# Patient Record
Sex: MALE | Race: WHITE | NOT HISPANIC OR LATINO | Employment: OTHER | URBAN - METROPOLITAN AREA
[De-identification: names, ages, dates, MRNs, and addresses within clinical notes are randomized per-mention and may not be internally consistent; named-entity substitution may affect disease eponyms.]

---

## 2023-05-04 ENCOUNTER — HOSPITAL ENCOUNTER (OUTPATIENT)
Facility: HOSPITAL | Age: 88
Discharge: NURSING, REHAB OR LONG-TERM CARE | End: 2023-05-08
Attending: EMERGENCY MEDICINE | Admitting: INTERNAL MEDICINE
Payer: OTHER GOVERNMENT

## 2023-05-04 DIAGNOSIS — R29.6 FREQUENT FALLS: ICD-10-CM

## 2023-05-04 DIAGNOSIS — R07.9 CHEST PAIN: ICD-10-CM

## 2023-05-04 DIAGNOSIS — S79.912A INJURY OF LEFT HIP, INITIAL ENCOUNTER: Primary | ICD-10-CM
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
## 2023-05-04 PROBLEM — I82.4Z3 ACUTE DEEP VEIN THROMBOSIS (DVT) OF DISTAL VEIN OF BOTH LOWER EXTREMITIES: Status: ACTIVE | Noted: 2023-04-03

## 2023-05-04 PROBLEM — N18.30 STAGE 3 CHRONIC KIDNEY DISEASE: Status: ACTIVE | Noted: 2023-05-04

## 2023-05-04 PROBLEM — L97.429 ULCER OF LEFT HEEL: Status: ACTIVE | Noted: 2023-05-04

## 2023-05-04 PROBLEM — E44.0 MALNUTRITION OF MODERATE DEGREE: Status: ACTIVE | Noted: 2023-05-04

## 2023-05-04 PROBLEM — I48.0 PAROXYSMAL ATRIAL FIBRILLATION: Status: ACTIVE | Noted: 2023-05-04

## 2023-05-04 PROBLEM — N40.0 BENIGN PROSTATIC HYPERPLASIA: Status: ACTIVE | Noted: 2023-05-04

## 2023-05-04 PROBLEM — Z95.0 S/P PLACEMENT OF CARDIAC PACEMAKER: Status: ACTIVE | Noted: 2023-05-04

## 2023-05-04 PROBLEM — D64.9 ANEMIA: Status: ACTIVE | Noted: 2023-05-04

## 2023-05-04 PROBLEM — E78.5 HYPERLIPIDEMIA: Status: ACTIVE | Noted: 2023-05-04

## 2023-05-04 PROBLEM — F03.918 DEMENTIA WITH BEHAVIORAL DISTURBANCE: Status: ACTIVE | Noted: 2023-05-04

## 2023-05-04 PROBLEM — I10 BENIGN ESSENTIAL HYPERTENSION: Status: ACTIVE | Noted: 2023-05-04

## 2023-05-04 PROBLEM — F41.1 ANXIETY STATE: Status: ACTIVE | Noted: 2023-05-04

## 2023-05-04 LAB
ALBUMIN SERPL BCP-MCNC: 2.9 G/DL (ref 3.5–5.2)
ALBUMIN SERPL BCP-MCNC: 2.9 G/DL (ref 3.5–5.2)
ALP SERPL-CCNC: 95 U/L (ref 55–135)
ALP SERPL-CCNC: 95 U/L (ref 55–135)
ALT SERPL W/O P-5'-P-CCNC: 10 U/L (ref 10–44)
ALT SERPL W/O P-5'-P-CCNC: 11 U/L (ref 10–44)
ANION GAP SERPL CALC-SCNC: 10 MMOL/L (ref 8–16)
ANION GAP SERPL CALC-SCNC: 10 MMOL/L (ref 8–16)
AST SERPL-CCNC: 20 U/L (ref 10–40)
AST SERPL-CCNC: 22 U/L (ref 10–40)
BASOPHILS # BLD AUTO: 0.06 K/UL (ref 0–0.2)
BASOPHILS # BLD AUTO: 0.07 K/UL (ref 0–0.2)
BASOPHILS NFR BLD: 0.6 % (ref 0–1.9)
BASOPHILS NFR BLD: 0.6 % (ref 0–1.9)
BILIRUB SERPL-MCNC: 0.8 MG/DL (ref 0.1–1)
BILIRUB SERPL-MCNC: 0.9 MG/DL (ref 0.1–1)
BILIRUB UR QL STRIP: NEGATIVE
BUN SERPL-MCNC: 22 MG/DL (ref 10–30)
BUN SERPL-MCNC: 24 MG/DL (ref 10–30)
CALCIUM SERPL-MCNC: 8.3 MG/DL (ref 8.7–10.5)
CALCIUM SERPL-MCNC: 8.3 MG/DL (ref 8.7–10.5)
CHLORIDE SERPL-SCNC: 105 MMOL/L (ref 95–110)
CHLORIDE SERPL-SCNC: 106 MMOL/L (ref 95–110)
CLARITY UR: CLEAR
CO2 SERPL-SCNC: 22 MMOL/L (ref 23–29)
CO2 SERPL-SCNC: 23 MMOL/L (ref 23–29)
COLOR UR: YELLOW
CREAT SERPL-MCNC: 1.5 MG/DL (ref 0.5–1.4)
CREAT SERPL-MCNC: 1.5 MG/DL (ref 0.5–1.4)
DIFFERENTIAL METHOD: ABNORMAL
DIFFERENTIAL METHOD: ABNORMAL
EOSINOPHIL # BLD AUTO: 0.4 K/UL (ref 0–0.5)
EOSINOPHIL # BLD AUTO: 0.4 K/UL (ref 0–0.5)
EOSINOPHIL NFR BLD: 3.2 % (ref 0–8)
EOSINOPHIL NFR BLD: 4.2 % (ref 0–8)
ERYTHROCYTE [DISTWIDTH] IN BLOOD BY AUTOMATED COUNT: 15.3 % (ref 11.5–14.5)
ERYTHROCYTE [DISTWIDTH] IN BLOOD BY AUTOMATED COUNT: 15.5 % (ref 11.5–14.5)
EST. GFR  (NO RACE VARIABLE): 44 ML/MIN/1.73 M^2
EST. GFR  (NO RACE VARIABLE): 44 ML/MIN/1.73 M^2
FOLATE SERPL-MCNC: 14.6 NG/ML (ref 4–24)
GLUCOSE SERPL-MCNC: 100 MG/DL (ref 70–110)
GLUCOSE SERPL-MCNC: 116 MG/DL (ref 70–110)
GLUCOSE UR QL STRIP: NEGATIVE
HCT VFR BLD AUTO: 23.8 % (ref 40–54)
HCT VFR BLD AUTO: 23.9 % (ref 40–54)
HGB BLD-MCNC: 7.7 G/DL (ref 14–18)
HGB BLD-MCNC: 7.8 G/DL (ref 14–18)
HGB UR QL STRIP: NEGATIVE
IMM GRANULOCYTES # BLD AUTO: 0.03 K/UL (ref 0–0.04)
IMM GRANULOCYTES # BLD AUTO: 0.03 K/UL (ref 0–0.04)
IMM GRANULOCYTES NFR BLD AUTO: 0.3 % (ref 0–0.5)
IMM GRANULOCYTES NFR BLD AUTO: 0.3 % (ref 0–0.5)
INR PPP: 1.2 (ref 0.8–1.2)
IRON SERPL-MCNC: 17 UG/DL (ref 45–160)
KETONES UR QL STRIP: NEGATIVE
LEUKOCYTE ESTERASE UR QL STRIP: NEGATIVE
LYMPHOCYTES # BLD AUTO: 2.4 K/UL (ref 1–4.8)
LYMPHOCYTES # BLD AUTO: 2.4 K/UL (ref 1–4.8)
LYMPHOCYTES NFR BLD: 20.7 % (ref 18–48)
LYMPHOCYTES NFR BLD: 24.3 % (ref 18–48)
MCH RBC QN AUTO: 31.6 PG (ref 27–31)
MCH RBC QN AUTO: 31.6 PG (ref 27–31)
MCHC RBC AUTO-ENTMCNC: 32.4 G/DL (ref 32–36)
MCHC RBC AUTO-ENTMCNC: 32.6 G/DL (ref 32–36)
MCV RBC AUTO: 97 FL (ref 82–98)
MCV RBC AUTO: 98 FL (ref 82–98)
MONOCYTES # BLD AUTO: 1.4 K/UL (ref 0.3–1)
MONOCYTES # BLD AUTO: 1.6 K/UL (ref 0.3–1)
MONOCYTES NFR BLD: 13.8 % (ref 4–15)
MONOCYTES NFR BLD: 14.1 % (ref 4–15)
NEUTROPHILS # BLD AUTO: 5.6 K/UL (ref 1.8–7.7)
NEUTROPHILS # BLD AUTO: 7.1 K/UL (ref 1.8–7.7)
NEUTROPHILS NFR BLD: 56.8 % (ref 38–73)
NEUTROPHILS NFR BLD: 61.1 % (ref 38–73)
NITRITE UR QL STRIP: NEGATIVE
NRBC BLD-RTO: 0 /100 WBC
NRBC BLD-RTO: 0 /100 WBC
PH UR STRIP: 6 [PH] (ref 5–8)
PLATELET # BLD AUTO: 239 K/UL (ref 150–450)
PLATELET # BLD AUTO: 241 K/UL (ref 150–450)
PMV BLD AUTO: 8.8 FL (ref 9.2–12.9)
PMV BLD AUTO: 9.2 FL (ref 9.2–12.9)
POTASSIUM SERPL-SCNC: 3.4 MMOL/L (ref 3.5–5.1)
POTASSIUM SERPL-SCNC: 3.5 MMOL/L (ref 3.5–5.1)
PROT SERPL-MCNC: 6.3 G/DL (ref 6–8.4)
PROT SERPL-MCNC: 6.5 G/DL (ref 6–8.4)
PROT UR QL STRIP: NEGATIVE
PROTHROMBIN TIME: 13 SEC (ref 9–12.5)
RBC # BLD AUTO: 2.44 M/UL (ref 4.6–6.2)
RBC # BLD AUTO: 2.47 M/UL (ref 4.6–6.2)
SATURATED IRON: 8 % (ref 20–50)
SODIUM SERPL-SCNC: 138 MMOL/L (ref 136–145)
SODIUM SERPL-SCNC: 138 MMOL/L (ref 136–145)
SP GR UR STRIP: 1.01 (ref 1–1.03)
TOTAL IRON BINDING CAPACITY: 212 UG/DL (ref 250–450)
TRANSFERRIN SERPL-MCNC: 143 MG/DL (ref 200–375)
URN SPEC COLLECT METH UR: NORMAL
UROBILINOGEN UR STRIP-ACNC: NEGATIVE EU/DL
VIT B12 SERPL-MCNC: 325 PG/ML (ref 210–950)
WBC # BLD AUTO: 11.65 K/UL (ref 3.9–12.7)
WBC # BLD AUTO: 9.82 K/UL (ref 3.9–12.7)

## 2023-05-04 PROCEDURE — 82746 ASSAY OF FOLIC ACID SERUM: CPT | Performed by: INTERNAL MEDICINE

## 2023-05-04 PROCEDURE — 30200315 PPD INTRADERMAL TEST REV CODE 302: Performed by: INTERNAL MEDICINE

## 2023-05-04 PROCEDURE — 25000003 PHARM REV CODE 250: Performed by: INTERNAL MEDICINE

## 2023-05-04 PROCEDURE — 36415 COLL VENOUS BLD VENIPUNCTURE: CPT | Performed by: EMERGENCY MEDICINE

## 2023-05-04 PROCEDURE — 96360 HYDRATION IV INFUSION INIT: CPT

## 2023-05-04 PROCEDURE — 97116 GAIT TRAINING THERAPY: CPT

## 2023-05-04 PROCEDURE — 85610 PROTHROMBIN TIME: CPT | Performed by: EMERGENCY MEDICINE

## 2023-05-04 PROCEDURE — 80053 COMPREHEN METABOLIC PANEL: CPT | Mod: 91 | Performed by: NURSE PRACTITIONER

## 2023-05-04 PROCEDURE — G0378 HOSPITAL OBSERVATION PER HR: HCPCS

## 2023-05-04 PROCEDURE — 86580 TB INTRADERMAL TEST: CPT | Performed by: INTERNAL MEDICINE

## 2023-05-04 PROCEDURE — 82607 VITAMIN B-12: CPT | Performed by: INTERNAL MEDICINE

## 2023-05-04 PROCEDURE — 36415 COLL VENOUS BLD VENIPUNCTURE: CPT | Performed by: NURSE PRACTITIONER

## 2023-05-04 PROCEDURE — 97162 PT EVAL MOD COMPLEX 30 MIN: CPT

## 2023-05-04 PROCEDURE — 85025 COMPLETE CBC W/AUTO DIFF WBC: CPT | Performed by: EMERGENCY MEDICINE

## 2023-05-04 PROCEDURE — 25000003 PHARM REV CODE 250: Performed by: NURSE PRACTITIONER

## 2023-05-04 PROCEDURE — 63600175 PHARM REV CODE 636 W HCPCS: Performed by: NURSE PRACTITIONER

## 2023-05-04 PROCEDURE — 80053 COMPREHEN METABOLIC PANEL: CPT | Performed by: EMERGENCY MEDICINE

## 2023-05-04 PROCEDURE — 84466 ASSAY OF TRANSFERRIN: CPT | Performed by: INTERNAL MEDICINE

## 2023-05-04 PROCEDURE — 99285 EMERGENCY DEPT VISIT HI MDM: CPT | Mod: 25

## 2023-05-04 PROCEDURE — 96361 HYDRATE IV INFUSION ADD-ON: CPT

## 2023-05-04 PROCEDURE — 85025 COMPLETE CBC W/AUTO DIFF WBC: CPT | Mod: 91 | Performed by: NURSE PRACTITIONER

## 2023-05-04 PROCEDURE — 81003 URINALYSIS AUTO W/O SCOPE: CPT | Performed by: INTERNAL MEDICINE

## 2023-05-04 PROCEDURE — 36415 COLL VENOUS BLD VENIPUNCTURE: CPT | Performed by: INTERNAL MEDICINE

## 2023-05-04 RX ORDER — SODIUM,POTASSIUM PHOSPHATES 280-250MG
2 POWDER IN PACKET (EA) ORAL
Status: DISCONTINUED | OUTPATIENT
Start: 2023-05-04 | End: 2023-05-08 | Stop reason: HOSPADM

## 2023-05-04 RX ORDER — TALC
9 POWDER (GRAM) TOPICAL NIGHTLY PRN
Status: DISCONTINUED | OUTPATIENT
Start: 2023-05-04 | End: 2023-05-08 | Stop reason: HOSPADM

## 2023-05-04 RX ORDER — ONDANSETRON 2 MG/ML
8 INJECTION INTRAMUSCULAR; INTRAVENOUS EVERY 6 HOURS PRN
Status: DISCONTINUED | OUTPATIENT
Start: 2023-05-04 | End: 2023-05-08 | Stop reason: HOSPADM

## 2023-05-04 RX ORDER — LANOLIN ALCOHOL/MO/W.PET/CERES
1000 CREAM (GRAM) TOPICAL DAILY
Status: DISCONTINUED | OUTPATIENT
Start: 2023-05-04 | End: 2023-05-08 | Stop reason: HOSPADM

## 2023-05-04 RX ORDER — SODIUM CHLORIDE, SODIUM LACTATE, POTASSIUM CHLORIDE, CALCIUM CHLORIDE 600; 310; 30; 20 MG/100ML; MG/100ML; MG/100ML; MG/100ML
INJECTION, SOLUTION INTRAVENOUS CONTINUOUS
Status: DISCONTINUED | OUTPATIENT
Start: 2023-05-04 | End: 2023-05-08 | Stop reason: HOSPADM

## 2023-05-04 RX ORDER — NYSTATIN 100000 U/G
CREAM TOPICAL 2 TIMES DAILY
COMMUNITY

## 2023-05-04 RX ORDER — QUETIAPINE FUMARATE 25 MG/1
50 TABLET, FILM COATED ORAL NIGHTLY
Status: DISCONTINUED | OUTPATIENT
Start: 2023-05-04 | End: 2023-05-08 | Stop reason: HOSPADM

## 2023-05-04 RX ORDER — ASPIRIN 81 MG/1
81 TABLET ORAL DAILY
COMMUNITY

## 2023-05-04 RX ORDER — TAMSULOSIN HYDROCHLORIDE 0.4 MG/1
0.4 CAPSULE ORAL DAILY
Status: DISCONTINUED | OUTPATIENT
Start: 2023-05-04 | End: 2023-05-08 | Stop reason: HOSPADM

## 2023-05-04 RX ORDER — TAMSULOSIN HYDROCHLORIDE 0.4 MG/1
CAPSULE ORAL DAILY
COMMUNITY

## 2023-05-04 RX ORDER — ATORVASTATIN CALCIUM 40 MG/1
40 TABLET, FILM COATED ORAL DAILY
Status: DISCONTINUED | OUTPATIENT
Start: 2023-05-04 | End: 2023-05-08 | Stop reason: HOSPADM

## 2023-05-04 RX ORDER — QUETIAPINE FUMARATE 50 MG/1
50 TABLET, FILM COATED ORAL NIGHTLY
COMMUNITY

## 2023-05-04 RX ORDER — LANOLIN ALCOHOL/MO/W.PET/CERES
800 CREAM (GRAM) TOPICAL
Status: DISCONTINUED | OUTPATIENT
Start: 2023-05-04 | End: 2023-05-08 | Stop reason: HOSPADM

## 2023-05-04 RX ORDER — GLUCAGON 1 MG
1 KIT INJECTION
Status: DISCONTINUED | OUTPATIENT
Start: 2023-05-04 | End: 2023-05-08 | Stop reason: HOSPADM

## 2023-05-04 RX ORDER — MIRTAZAPINE 15 MG/1
15 TABLET, FILM COATED ORAL NIGHTLY
COMMUNITY

## 2023-05-04 RX ORDER — ACETAMINOPHEN 325 MG/1
650 TABLET ORAL EVERY 4 HOURS PRN
Status: DISCONTINUED | OUTPATIENT
Start: 2023-05-04 | End: 2023-05-08 | Stop reason: HOSPADM

## 2023-05-04 RX ORDER — ACETAMINOPHEN 325 MG/1
650 TABLET ORAL EVERY 8 HOURS PRN
Status: DISCONTINUED | OUTPATIENT
Start: 2023-05-04 | End: 2023-05-08 | Stop reason: HOSPADM

## 2023-05-04 RX ORDER — DEXTROSE 40 %
30 GEL (GRAM) ORAL
Status: DISCONTINUED | OUTPATIENT
Start: 2023-05-04 | End: 2023-05-08 | Stop reason: HOSPADM

## 2023-05-04 RX ORDER — POLYETHYLENE GLYCOL 3350 17 G/17G
17 POWDER, FOR SOLUTION ORAL DAILY
Status: DISCONTINUED | OUTPATIENT
Start: 2023-05-04 | End: 2023-05-08 | Stop reason: HOSPADM

## 2023-05-04 RX ORDER — MEMANTINE HYDROCHLORIDE 10 MG/1
5 TABLET ORAL 2 TIMES DAILY
COMMUNITY

## 2023-05-04 RX ORDER — MAG HYDROX/ALUMINUM HYD/SIMETH 200-200-20
30 SUSPENSION, ORAL (FINAL DOSE FORM) ORAL 4 TIMES DAILY PRN
Status: DISCONTINUED | OUTPATIENT
Start: 2023-05-04 | End: 2023-05-08 | Stop reason: HOSPADM

## 2023-05-04 RX ORDER — ASPIRIN 81 MG/1
81 TABLET ORAL DAILY
Status: DISCONTINUED | OUTPATIENT
Start: 2023-05-04 | End: 2023-05-08 | Stop reason: HOSPADM

## 2023-05-04 RX ORDER — NALOXONE HCL 0.4 MG/ML
0.02 VIAL (ML) INJECTION
Status: DISCONTINUED | OUTPATIENT
Start: 2023-05-04 | End: 2023-05-08 | Stop reason: HOSPADM

## 2023-05-04 RX ORDER — CARVEDILOL 3.12 MG/1
3.12 TABLET ORAL 2 TIMES DAILY WITH MEALS
Status: DISCONTINUED | OUTPATIENT
Start: 2023-05-04 | End: 2023-05-08 | Stop reason: HOSPADM

## 2023-05-04 RX ORDER — MIRTAZAPINE 15 MG/1
15 TABLET, FILM COATED ORAL NIGHTLY
Status: DISCONTINUED | OUTPATIENT
Start: 2023-05-04 | End: 2023-05-08 | Stop reason: HOSPADM

## 2023-05-04 RX ORDER — MEMANTINE HYDROCHLORIDE 5 MG/1
5 TABLET ORAL 2 TIMES DAILY
Status: DISCONTINUED | OUTPATIENT
Start: 2023-05-04 | End: 2023-05-08 | Stop reason: HOSPADM

## 2023-05-04 RX ORDER — DEXTROSE 40 %
15 GEL (GRAM) ORAL
Status: DISCONTINUED | OUTPATIENT
Start: 2023-05-04 | End: 2023-05-08 | Stop reason: HOSPADM

## 2023-05-04 RX ORDER — CARVEDILOL 6.25 MG/1
6.25 TABLET ORAL 2 TIMES DAILY WITH MEALS
Status: DISCONTINUED | OUTPATIENT
Start: 2023-05-04 | End: 2023-05-04

## 2023-05-04 RX ORDER — SIMETHICONE 80 MG
1 TABLET,CHEWABLE ORAL 4 TIMES DAILY PRN
Status: DISCONTINUED | OUTPATIENT
Start: 2023-05-04 | End: 2023-05-08 | Stop reason: HOSPADM

## 2023-05-04 RX ORDER — CARVEDILOL 6.25 MG/1
6.25 TABLET ORAL 2 TIMES DAILY WITH MEALS
Status: ON HOLD | COMMUNITY
End: 2023-05-08 | Stop reason: HOSPADM

## 2023-05-04 RX ORDER — FUROSEMIDE 40 MG/1
40 TABLET ORAL DAILY
Status: ON HOLD | COMMUNITY
End: 2023-05-08 | Stop reason: HOSPADM

## 2023-05-04 RX ORDER — SODIUM CHLORIDE 0.9 % (FLUSH) 0.9 %
3 SYRINGE (ML) INJECTION EVERY 12 HOURS PRN
Status: DISCONTINUED | OUTPATIENT
Start: 2023-05-04 | End: 2023-05-08 | Stop reason: HOSPADM

## 2023-05-04 RX ORDER — FUROSEMIDE 40 MG/1
40 TABLET ORAL 2 TIMES DAILY
Status: DISCONTINUED | OUTPATIENT
Start: 2023-05-04 | End: 2023-05-04

## 2023-05-04 RX ADMIN — APIXABAN 5 MG: 2.5 TABLET, FILM COATED ORAL at 08:05

## 2023-05-04 RX ADMIN — SODIUM CHLORIDE, POTASSIUM CHLORIDE, SODIUM LACTATE AND CALCIUM CHLORIDE: 600; 310; 30; 20 INJECTION, SOLUTION INTRAVENOUS at 06:05

## 2023-05-04 RX ADMIN — MEMANTINE 5 MG: 5 TABLET ORAL at 08:05

## 2023-05-04 RX ADMIN — TAMSULOSIN HYDROCHLORIDE 0.4 MG: 0.4 CAPSULE ORAL at 08:05

## 2023-05-04 RX ADMIN — MIRTAZAPINE 15 MG: 15 TABLET, FILM COATED ORAL at 09:05

## 2023-05-04 RX ADMIN — CARVEDILOL 6.25 MG: 6.25 TABLET, FILM COATED ORAL at 08:05

## 2023-05-04 RX ADMIN — APIXABAN 5 MG: 2.5 TABLET, FILM COATED ORAL at 09:05

## 2023-05-04 RX ADMIN — ATORVASTATIN CALCIUM 40 MG: 40 TABLET, FILM COATED ORAL at 08:05

## 2023-05-04 RX ADMIN — FUROSEMIDE 40 MG: 40 TABLET ORAL at 08:05

## 2023-05-04 RX ADMIN — CARVEDILOL 3.12 MG: 3.12 TABLET, FILM COATED ORAL at 05:05

## 2023-05-04 RX ADMIN — TUBERCULIN PURIFIED PROTEIN DERIVATIVE 5 UNITS: 5 INJECTION, SOLUTION INTRADERMAL at 04:05

## 2023-05-04 RX ADMIN — QUETIAPINE 50 MG: 25 TABLET ORAL at 09:05

## 2023-05-04 RX ADMIN — ASPIRIN 81 MG: 81 TABLET, COATED ORAL at 08:05

## 2023-05-04 RX ADMIN — MEMANTINE 5 MG: 5 TABLET ORAL at 09:05

## 2023-05-04 NOTE — PLAN OF CARE
05/04/23 1400   CHRISTINE Message   Medicare Outpatient and Observation Notification regarding financial responsibility Given to patient/caregiver;Signed/date by patient/caregiver;Explained to patient/caregiver   Date CHRISTINE was signed 05/04/23   Time CHRISTINE was signed 1400     Explained to pt's daughter, Jesika, over the phone. All questions answered and Lizbeth gave telephone consent for form

## 2023-05-04 NOTE — HPI
Babak Nunez is a 89 year old male with a past medical history of Afib, HTN, HLD, dementia, BPH, DVT and pacemaker placement, who presented to the ED with a complaint of left hip pain after sustaining a fall at home. HPI per family and patient. His family reports that he has been having difficulty with his left hip, which has been coming out of socket more frequently. He was due to have surgery on it today but due to recent events was cancelled. According to his daughters, after a recent admission at [facility], he was sent to [facility] in [place] after hip surgery. They were told that in order to discharge him back home, they would be setting up a hospital bed, walker and wheelchair, as well as other home services to provide for a safe discharge. He began to develop some behavioral issues and was transferred to [facility], where he was diagnosed with early dementia. [facility] discharged the patient back to [facility], but [facility] would not accept for unclear reasons, causing the patient to return home with no DME or home health services. He states he does not want to live in a nursing home, and wants to be at his own house. His wife is also elderly with multiple medical issues, and is unable to care for him on her own. The patient currently denies complaint except for some soreness to the left hip. He denies other complaint    ED work up included a CBC, which showed chronic anemia. CMP showed CKD and chronic moderate malnutrition. Xray of the left hip showed no acute fracture or dislocation. Due increase in falls with multiple risk factors for complication, decision made to admit to observation for PT/OT evaluation and case management consultation. Hospital Medicine consulted for admission and further management.

## 2023-05-04 NOTE — PLAN OF CARE
Problem: Physical Therapy  Goal: Physical Therapy Goal  Description: Goals to be met by: 2023     Patient will increase functional independence with mobility by performin. Supine to sit with MInimal Assistance  2. Sit to stand transfer with Minimal Assistance  3. Bed to chair transfer with Minimal Assistance using Rolling Walker  4. Gait  x 100 feet with Minimal Assistance using Rolling Walker.   5. Lower extremity exercise program x20 reps   Outcome: Ongoing, Progressing   PT eval and treat. Gait 30ft with RW min /mod assist with another person following with chair. SNF

## 2023-05-04 NOTE — PT/OT/SLP EVAL
Physical Therapy Evaluation    Patient Name:  Babak Nunez   MRN:  [id number]    Recommendations:     Discharge Recommendations: nursing facility, skilled   Discharge Equipment Recommendations: none   Barriers to discharge: Decreased caregiver support    Assessment:     Babak Nunez is a 89 y.o. male admitted with a medical diagnosis of Frequent falls.  He presents with the following impairments/functional limitations: weakness, impaired endurance, impaired functional mobility, gait instability, impaired balance, decreased lower extremity function, decreased safety awareness, impaired cardiopulmonary response to activity .    Pt seen supine in bed and agreeable to PT. Pt c/o having the urge to urinate but couldn't. Pt requiring min/mod assist to sit EOB with extra time. Pt stood with RW min/mod assist and ambulated with RW 30ft with another person following with chair with seated rest. Pt stated of 2 falls at home.  Pt to benefit from continued therapies at SNF.    Rehab Prognosis: Fair; patient would benefit from acute skilled PT services to address these deficits and reach maximum level of function.    Recent Surgery: * No surgery found *      Plan:     During this hospitalization, patient to be seen 6 x/week to address the identified rehab impairments via gait training, therapeutic activities, therapeutic exercises and progress toward the following goals:    Plan of Care Expires:  05/30/23    Subjective   Pt stated that he lives at home with spouse who has other medical issues  Stated of having fallen 2x at home  Stated he has 2 home  [place] and in [place]  Chief Complaint: needing to urinate but couldn't  Patient/Family Comments/goals: get stronger  Pain/Comfort:  Pain Rating 1: 0/10    Patients cultural, spiritual, [employer]al conflicts given the current situation:      Living Environment:  Home with spouse  Prior to admission, patients level of function was ambulatory household distance with falls.  Equipment  used at home: walker, rolling.  DME owned (not currently used): none.  Upon discharge, patient will have assistance from family.    Objective:     Communicated with nurse Mckinney prior to session.  Patient found HOB elevated with telemetry, bed alarm  upon PT entry to room.    General Precautions: Standard, fall  Orthopedic Precautions:N/A   Braces: N/A  Respiratory Status: Room air    Exams:  Postural Exam:  Patient presented with the following abnormalities:    -       Rounded shoulders  -       Forward head  -       BMI 24  RLE ROM: WFL  RLE Strength: Deficits: 3/5  LLE ROM: WFL  LLE Strength: Deficits: 3/5    Functional Mobility:  Bed Mobility:     Scooting: moderate assistance  Supine to Sit: moderate assistance  Transfers:     Sit to Stand:  minimum assistance and moderate assistance with rolling walker  Bed to Chair: moderate assistance with  rolling walker  using  Stand Pivot  Gait: 30ft with RW min/mod assist and another person following with chair      AM-PAC 6 CLICK MOBILITY  Total Score:16       Treatment & Education:  Patient was educated on the importance of OOB activity and functional mobility to negate negative effects of prolonged bed rest during hospitalization, safe transfers and ambulation, and D/C planning   OOB chair post PT  Pt attempting to void but unable    Patient left up in chair with all lines intact, call button in reach, chair alarm on, and nurse Mckinney present.    GOALS:   Multidisciplinary Problems       Physical Therapy Goals          Problem: Physical Therapy    Goal Priority Disciplines Outcome Goal Variances Interventions   Physical Therapy Goal     PT, PT/OT Ongoing, Progressing     Description: Goals to be met by: 2023     Patient will increase functional independence with mobility by performin. Supine to sit with MInimal Assistance  2. Sit to stand transfer with Minimal Assistance  3. Bed to chair transfer with Minimal Assistance using Rolling Walker  4. Gait  x  100 feet with Minimal Assistance using Rolling Walker.   5. Lower extremity exercise program x20 reps                        History:     Past Medical History:   Diagnosis Date    Afib     Anticoagulant long-term use     BPH (benign prostatic hyperplasia)     Hyperlipemia     Hypertension     Renal disorder        Past Surgical History:   Procedure Laterality Date    CARDIAC SURGERY      HIP REPLACEMENT ARTHROPLASTY Left     INSERTION OF PACEMAKER         Time Tracking:     PT Received On: 05/04/23  PT Start Time: 1014     PT Stop Time: 1042  PT Total Time (min): 28 min     Billable Minutes: Evaluation 10 and Gait Training 18      05/04/2023

## 2023-05-04 NOTE — ASSESSMENT & PLAN NOTE
Chronic, controlled.  Latest blood pressure and vitals reviewed-   Temp:  [98.4 °F (36.9 °C)]   Pulse:  [60-65]   Resp:  [18]   BP: (122-131)/(60-70)   SpO2:  [98 %-99 %] .   Home meds for hypertension were reviewed and noted below.   Hypertension Medications             carvediloL (COREG) 6.25 MG tablet Take 6.25 mg by mouth 2 (two) times daily with meals.    furosemide (LASIX) 40 MG tablet Take 40 mg by mouth 2 (two) times daily.          While in the hospital, will manage blood pressure as follows; Continue home antihypertensive regimen    Will utilize p.r.n. blood pressure medication only if patient's blood pressure greater than  180/110 and he develops symptoms such as worsening chest pain or shortness of breath.

## 2023-05-04 NOTE — ASSESSMENT & PLAN NOTE
Nutrition consulted. Most recent weight and BMI monitored-     Measurements:  Wt Readings from Last 1 Encounters:   05/04/23 94.3 kg (208 lb)   Body mass index is 29.01 kg/m².    Patient has been screened and assessed by RD.    Malnutrition Type:  Context:    Level:      Malnutrition Characteristic Summary:       Interventions/Recommendations (treatment strategy):

## 2023-05-04 NOTE — UM SECONDARY REVIEW
Other (see comment)    Level of Care Issue    Call received from Astrid with PHN stating pt does not meet observation. Offering peer to peer. Message to Dr Estrada with no response. Case being sent to MRU for review. kv

## 2023-05-04 NOTE — SUBJECTIVE & OBJECTIVE
Past Medical History:   Diagnosis Date    Afib     Anticoagulant long-term use     BPH (benign prostatic hyperplasia)     Hyperlipemia     Hypertension     Renal disorder        Past Surgical History:   Procedure Laterality Date    CARDIAC SURGERY      HIP REPLACEMENT ARTHROPLASTY Left     INSERTION OF PACEMAKER         Review of patient's allergies indicates:  No Known Allergies    No current facility-administered medications on file prior to encounter.     Current Outpatient Medications on File Prior to Encounter   Medication Sig    apixaban (ELIQUIS) 5 mg Tab Take 5 mg by mouth 2 (two) times daily.    aspirin (ASPIR-81) 81 MG EC tablet Take 81 mg by mouth once daily.    ATORVASTATIN CALCIUM (ATORVASTATIN ORAL) Take by mouth.    carvediloL (COREG) 6.25 MG tablet Take 6.25 mg by mouth 2 (two) times daily with meals.    furosemide (LASIX) 40 MG tablet Take 40 mg by mouth 2 (two) times daily.    memantine (NAMENDA) 10 MG Tab Take 5 mg by mouth 2 (two) times daily.    mirtazapine (REMERON) 15 MG tablet Take 15 mg by mouth every evening.    nystatin (MYCOSTATIN) cream Apply topically 2 (two) times daily.    QUEtiapine (SEROQUEL) 50 MG tablet Take 50 mg by mouth every evening.    tamsulosin (FLOMAX) 0.4 mg Cap Take by mouth once daily.    [DISCONTINUED] FLUZONE HIGH-DOSE 2015-16, PF, 180 mcg/0.5 mL Syrg     [DISCONTINUED] WARFARIN SODIUM (COUMADIN ORAL) Take by mouth.     Family History    None       Tobacco Use    Smoking status: Never    Smokeless tobacco: Not on file   Substance and Sexual Activity    Alcohol use: Yes     Comment: 2beers/day    Drug use: Not on file    Sexual activity: Not on file     Review of Systems   Constitutional:  Negative for chills and fever.   HENT:  Negative for congestion and sore throat.    Eyes:  Negative for visual disturbance.   Respiratory:  Negative for cough and shortness of breath.    Cardiovascular:  Negative for chest pain and palpitations.   Gastrointestinal:  Negative for  abdominal pain, constipation, diarrhea, nausea and vomiting.   Endocrine: Negative for cold intolerance and heat intolerance.   Genitourinary:  Negative for dysuria and hematuria.   Musculoskeletal:  Positive for arthralgias and gait problem (frequent falls). Negative for myalgias.   Skin:  Negative for rash.   Neurological:  Negative for dizziness, tremors and seizures.   Hematological:  Negative for adenopathy. Does not bruise/bleed easily.   All other systems reviewed and are negative.  Objective:     Vital Signs (Most Recent):  Temp: 98.4 °F (36.9 °C) (05/04/23 0040)  Pulse: 60 (05/04/23 0434)  Resp: 18 (05/04/23 0040)  BP: (!) 109/53 (05/04/23 0434)  SpO2: 96 % (05/04/23 0434)   Vital Signs (24h Range):  Temp:  [98.4 °F (36.9 °C)] 98.4 °F (36.9 °C)  Pulse:  [60-65] 60  Resp:  [18] 18  SpO2:  [96 %-99 %] 96 %  BP: (109-131)/(53-70) 109/53     Weight: 94.3 kg (208 lb)  Body mass index is 29.01 kg/m².    Physical Exam  Vitals and nursing note reviewed.   Constitutional:       General: He is awake. He is not in acute distress.     Appearance: Normal appearance. He is well-developed and overweight. He is ill-appearing (elderly).   HENT:      Head: Normocephalic and atraumatic.      Nose: Nose normal. No septal deviation.      Mouth/Throat:      Lips: Pink.      Mouth: Mucous membranes are moist.   Eyes:      Conjunctiva/sclera: Conjunctivae normal.      Pupils: Pupils are equal, round, and reactive to light.   Neck:      Thyroid: No thyroid mass.      Vascular: No JVD.      Trachea: No tracheal tenderness or tracheal deviation.   Cardiovascular:      Rate and Rhythm: Normal rate and regular rhythm.      Heart sounds: S1 normal and S2 normal. No murmur heard.    No friction rub. No gallop.   Pulmonary:      Effort: Pulmonary effort is normal.      Breath sounds: Normal breath sounds. No decreased breath sounds, wheezing, rhonchi or rales.   Abdominal:      General: Bowel sounds are normal. There is no distension.       Palpations: Abdomen is soft. There is no hepatomegaly, splenomegaly or mass.      Tenderness: There is no abdominal tenderness.   Musculoskeletal:      Cervical back: Full passive range of motion without pain, normal range of motion and neck supple.      Left hip: Decreased range of motion.      Left lower leg: Edema present.   Skin:     General: Skin is warm.      Findings: Wound present. No rash.          Neurological:      General: No focal deficit present.      Mental Status: He is alert and oriented to person, place, and time.      Cranial Nerves: No cranial nerve deficit.      Sensory: No sensory deficit.   Psychiatric:         Mood and Affect: Mood normal.         Behavior: Behavior normal. Behavior is cooperative.         CRANIAL NERVES     CN III, IV, VI   Pupils are equal, round, and reactive to light.     Significant Labs: All pertinent labs within the past 24 hours have been reviewed.  CBC:   Recent Labs   Lab 05/04/23  0124   WBC 11.65   HGB 7.8*   HCT 23.9*        CMP:   Recent Labs   Lab 05/04/23  0124      K 3.5      CO2 22*   *   BUN 24   CREATININE 1.5*   CALCIUM 8.3*   PROT 6.5   ALBUMIN 2.9*   BILITOT 0.8   ALKPHOS 95   AST 20   ALT 11   ANIONGAP 10     Coagulation:   Recent Labs   Lab 05/04/23  0124   INR 1.2       Significant Imaging: I have reviewed all pertinent imaging results/findings within the past 24 hours.  Xray left hip: no acute findings

## 2023-05-04 NOTE — PLAN OF CARE
Problem: Adult Inpatient Plan of Care  Goal: Plan of Care Review  Outcome: Ongoing, Progressing  Goal: Patient-Specific Goal (Individualized)  Outcome: Ongoing, Progressing  Goal: Absence of Hospital-Acquired Illness or Injury  Outcome: Ongoing, Progressing  Goal: Optimal Comfort and Wellbeing  Outcome: Ongoing, Progressing  Goal: Readiness for Transition of Care  Outcome: Ongoing, Progressing     Problem: Fall Injury Risk  Goal: Absence of Fall and Fall-Related Injury  Outcome: Ongoing, Progressing   Plan of care reviewed with patient & daughters verbalized understanding.  IV fluids administered as per orders. Remains free from falls, injury. Instructed to call for assistance as needed ,verbalized understanding. Bed in low & locked position. Call light in reach, bed alarm on .

## 2023-05-04 NOTE — PROGRESS NOTES
[facility]  Adult Nutrition  Progress Note    SUMMARY       Recommendations  1) Continue Cardiac diet  2) Send Boost plus 1 x daily + Mirza BID   3) weigh weekly    Goals: 1) PO Intakes > 50% of meals and supplements at f/u  Nutrition Goal Status: new  Communication of RD Recs:  (POC, sticky note)     Assessment and Plan    Malnutrition of moderate degree  Malnutrition Type:  Context: chronic illness  Level: moderate    Related to (etiology):   Inadequate energy/protein intake    Signs and Symptoms (as evidenced by):   Wound  Malnutrition Characteristic Summary:  Subcutaneous Fat (Malnutrition): mild depletion  Muscle Mass (Malnutrition): mild depletion  Fluid Accumulation (Malnutrition): moderate (3+)      Interventions/Recommendations (treatment strategy):  Fat/sodium modified diet and nutrition supplement therapy  1) Continue Cardiac diet 2) Send Boost plus 1 x daily 3) weigh weekly    Nutrition Diagnosis Status:   new         Malnutrition Assessment  Malnutrition Context: chronic illness  Malnutrition Level: moderate  Skin (Micronutrient):  (Jack = 16, heel ulcer)   Micronutrient Evaluation Summary: suspected deficiency  Micronutrient Evaluation Comments: potassium and check iron, vitamin C, zinc   Subcutaneous Fat (Malnutrition): mild depletion  Muscle Mass (Malnutrition): mild depletion  Fluid Accumulation (Malnutrition): moderate (3+)   Orbital Region (Subcutaneous Fat Loss): mild depletion  Upper Arm Region (Subcutaneous Fat Loss): mild depletion  Thoracic and Lumbar Region: mild depletion   [place] Region (Muscle Loss): mild depletion  Clavicle Bone Region (Muscle Loss): mild depletion  Clavicle and Acromion Bone Region (Muscle Loss): mild depletion  Dorsal Hand (Muscle Loss): mild depletion  Patellar Region (Muscle Loss): mild depletion  Anterior Thigh Region (Muscle Loss): mild depletion  Posterior Calf Region (Muscle Loss): mild depletion            Reason for Assessment    Reason  "For Assessment: identified at risk by screening criteria  Diagnosis:  (frequent falls)  Relevant Medical History: BPH, DVT, AFIB, pacemaker, HTN, HLD, dementia  Interdisciplinary Rounds: attended    General Information Comments: 89 y/o male admitted with hip pain s/p frequent falls. @ visit, pt tells me he ate 100% of breakfast and usually eats 3 meals daily at home, unsure the accuracy of his recall. Denies GI symptoms. NFPE 5/4/23 mild wasting seen + wound. No significant wt loss per chart reviews.    Nutrition Discharge Planning: To be determined- Cardiac diet + Boost plus 1-2 x daily if needed + Mirza BID    Nutrition Risk Screen    Nutrition Risk Screen: no indicators present    Nutrition/Diet History    Patient Reported Diet/Restrictions/Preferences: general  Spiritual, Cultural Beliefs, Latter day Practices, Values that Affect Care: no  Food Allergies: NKFA  Factors Affecting Nutritional Intake: None identified at this time    Anthropometrics    Temp: 97.8 °F (36.6 °C)  Height Method: Stated  Height: 5' 10"  Height (inches): 70 in  Weight Method: Bed Scale  Weight: 76.2 kg (167 lb 15.9 oz)  Weight (lb): 167.99 lb  Ideal Body Weight (IBW), Male: 166 lb  % Ideal Body Weight, Male (lb): 101.2 %  BMI (Calculated): 24.1  BMI Grade: 18.5-24.9 - normal      Wt Readings from Last 30 Encounters:   05/04/23 76.2 kg (167 lb 15.9 oz)   12/28/15 89.4 kg (197 lb)   12/13/15 89.4 kg (197 lb 1.6 oz)       Lab/Procedures/Meds    Pertinent Labs Reviewed: reviewed  BMP  Lab Results   Component Value Date     05/04/2023    K 3.4 (L) 05/04/2023     05/04/2023    CO2 23 05/04/2023    BUN 22 05/04/2023    CREATININE 1.5 (H) 05/04/2023    CALCIUM 8.3 (L) 05/04/2023    ANIONGAP 10 05/04/2023    EGFRNORACEVR 44 (A) 05/04/2023     Lab Results   Component Value Date    ALBUMIN 2.9 (L) 05/04/2023   '  Pertinent Medications Reviewed: reviewed  Pertinent Medications Comments: Kbicarb, mirtazapine, zofran, KNaPhos, lasix, statin, " polyethylene glycol, lactated ringers @ 75 ml/hr    Estimated/Assessed Needs    Weight Used For Calorie Calculations: 76.2 kg (167 lb 15.9 oz)  Energy Calorie Requirements (kcal): MSJ ( x 1.2-1.4) = 5917-3614 kcal  Energy Need Method: [place]-St Jeor  Protein Requirements: 1.2-1.5 g protein;/kg ( wasting/wound) =  g  Weight Used For Protein Calculations: 76.2 kg (167 lb 15.9 oz)  Fluid Requirements (mL): 3440-8853 ml or oper MD  Estimated Fluid Requirement Method: RDA Method  CHO Requirement: N/A      Nutrition Prescription Ordered    Current Diet Order: Cardiac Diet    Evaluation of Received Nutrient/Fluid Intake    Energy Calories Required: meeting needs  Protein Required: meeting needs  Fluid Required: not meeting needs  Tolerance: tolerating     Intake/Output Summary (Last 24 hours) at 5/4/2023 1151  Last data filed at 5/4/2023 0800  Gross per 24 hour   Intake 340 ml   Output --   Net 340 ml       % Intake of Estimated Energy Needs: %  % Meal Intake: % per pt    Nutrition Risk    Level of Risk/Frequency of Follow-up:  (2 x weekly)     Monitor and Evaluation    Food and Nutrient Intake: energy intake, food and beverage intake  Food and Nutrient Adminstration: diet order  Physical Activity and Function: nutrition-related ADLs and IADLs  Anthropometric Measurements: weight  Biochemical Data, Medical Tests and Procedures: electrolyte and renal panel, gastrointestinal profile  Nutrition-Focused Physical Findings: skin     Nutrition Follow-Up    RD Follow-up?: Yes

## 2023-05-04 NOTE — H&P
[facility] Medicine  History & Physical    Patient Name: Babak Nunez  MRN: [id number]  Patient Class: OP- Observation  Admission Date: 5/4/2023  Attending Physician: Haritha Estrada MD   Primary Care Provider: [facility]         Patient information was obtained from patient, relative(s), past medical records and ER records.     Subjective:     Principal Problem:Frequent falls    Chief Complaint:   Chief Complaint   Patient presents with    Fall     Per ems, fell out of bed going to bathroom.  Left hip pain, no LOC.  + shortening of left leg.  Pt. On blood thinners.          HPI: Babak Nunez is a 89 year old male with a past medical history of Afib, HTN, HLD, dementia, BPH, DVT and pacemaker placement, who presented to the ED with a complaint of left hip pain after sustaining a fall at home. HPI per family and patient. His family reports that he has been having difficulty with his left hip, which has been coming out of socket more frequently. He was due to have surgery on it today but due to recent events was cancelled. According to his daughters, after a recent admission at [facility], he was sent to [facility] in [place] after hip surgery. They were told that in order to discharge him back home, they would be setting up a hospital bed, walker and wheelchair, as well as other home services to provide for a safe discharge. He began to develop some behavioral issues and was transferred to [facility], where he was diagnosed with early dementia. [facility] discharged the patient back to [facility], but [facility] would not accept for unclear reasons, causing the patient to return home with no DME or home health services. He states he does not want to live in a nursing home, and wants to be at his own house. His wife is also elderly with multiple medical issues, and is unable to care for him on her own. The patient currently denies complaint except for some soreness to the left hip. He denies  other complaint    ED work up included a CBC, which showed chronic anemia. CMP showed CKD and chronic moderate malnutrition. Xray of the left hip showed no acute fracture or dislocation. Due increase in falls with multiple risk factors for complication, decision made to admit to observation for PT/OT evaluation and case management consultation. Hospital Medicine consulted for admission and further management.         Past Medical History:   Diagnosis Date    Afib     Anticoagulant long-term use     BPH (benign prostatic hyperplasia)     Hyperlipemia     Hypertension     Renal disorder        Past Surgical History:   Procedure Laterality Date    CARDIAC SURGERY      HIP REPLACEMENT ARTHROPLASTY Left     INSERTION OF PACEMAKER         Review of patient's allergies indicates:  No Known Allergies    No current facility-administered medications on file prior to encounter.     Current Outpatient Medications on File Prior to Encounter   Medication Sig    apixaban (ELIQUIS) 5 mg Tab Take 5 mg by mouth 2 (two) times daily.    aspirin (ASPIR-81) 81 MG EC tablet Take 81 mg by mouth once daily.    ATORVASTATIN CALCIUM (ATORVASTATIN ORAL) Take by mouth.    carvediloL (COREG) 6.25 MG tablet Take 6.25 mg by mouth 2 (two) times daily with meals.    furosemide (LASIX) 40 MG tablet Take 40 mg by mouth 2 (two) times daily.    memantine (NAMENDA) 10 MG Tab Take 5 mg by mouth 2 (two) times daily.    mirtazapine (REMERON) 15 MG tablet Take 15 mg by mouth every evening.    nystatin (MYCOSTATIN) cream Apply topically 2 (two) times daily.    QUEtiapine (SEROQUEL) 50 MG tablet Take 50 mg by mouth every evening.    tamsulosin (FLOMAX) 0.4 mg Cap Take by mouth once daily.    [DISCONTINUED] FLUZONE HIGH-DOSE 2015-16, PF, 180 mcg/0.5 mL Syrg     [DISCONTINUED] WARFARIN SODIUM (COUMADIN ORAL) Take by mouth.     Family History    None       Tobacco Use    Smoking status: Never    Smokeless tobacco: Not on file   Substance and Sexual Activity     Alcohol use: Yes     Comment: 2beers/day    Drug use: Not on file    Sexual activity: Not on file     Review of Systems   Constitutional:  Negative for chills and fever.   HENT:  Negative for congestion and sore throat.    Eyes:  Negative for visual disturbance.   Respiratory:  Negative for cough and shortness of breath.    Cardiovascular:  Negative for chest pain and palpitations.   Gastrointestinal:  Negative for abdominal pain, constipation, diarrhea, nausea and vomiting.   Endocrine: Negative for cold intolerance and heat intolerance.   Genitourinary:  Negative for dysuria and hematuria.   Musculoskeletal:  Positive for arthralgias and gait problem (frequent falls). Negative for myalgias.   Skin:  Negative for rash.   Neurological:  Negative for dizziness, tremors and seizures.   Hematological:  Negative for adenopathy. Does not bruise/bleed easily.   All other systems reviewed and are negative.  Objective:     Vital Signs (Most Recent):  Temp: 98.4 °F (36.9 °C) (05/04/23 0040)  Pulse: 60 (05/04/23 0434)  Resp: 18 (05/04/23 0040)  BP: (!) 109/53 (05/04/23 0434)  SpO2: 96 % (05/04/23 0434)   Vital Signs (24h Range):  Temp:  [98.4 °F (36.9 °C)] 98.4 °F (36.9 °C)  Pulse:  [60-65] 60  Resp:  [18] 18  SpO2:  [96 %-99 %] 96 %  BP: (109-131)/(53-70) 109/53     Weight: 94.3 kg (208 lb)  Body mass index is 29.01 kg/m².    Physical Exam  Vitals and nursing note reviewed.   Constitutional:       General: He is awake. He is not in acute distress.     Appearance: Normal appearance. He is well-developed and overweight. He is ill-appearing (elderly).   HENT:      Head: Normocephalic and atraumatic.      Nose: Nose normal. No septal deviation.      Mouth/Throat:      Lips: Pink.      Mouth: Mucous membranes are moist.   Eyes:      Conjunctiva/sclera: Conjunctivae normal.      Pupils: Pupils are equal, round, and reactive to light.   Neck:      Thyroid: No thyroid mass.      Vascular: No JVD.      Trachea: No tracheal tenderness  or tracheal deviation.   Cardiovascular:      Rate and Rhythm: Normal rate and regular rhythm.      Heart sounds: S1 normal and S2 normal. No murmur heard.    No friction rub. No gallop.   Pulmonary:      Effort: Pulmonary effort is normal.      Breath sounds: Normal breath sounds. No decreased breath sounds, wheezing, rhonchi or rales.   Abdominal:      General: Bowel sounds are normal. There is no distension.      Palpations: Abdomen is soft. There is no hepatomegaly, splenomegaly or mass.      Tenderness: There is no abdominal tenderness.   Musculoskeletal:      Cervical back: Full passive range of motion without pain, normal range of motion and neck supple.      Left hip: Decreased range of motion.      Left lower leg: Edema present.   Skin:     General: Skin is warm.      Findings: Wound present. No rash.          Neurological:      General: No focal deficit present.      Mental Status: He is alert and oriented to person, place, and time.      Cranial Nerves: No cranial nerve deficit.      Sensory: No sensory deficit.   Psychiatric:         Mood and Affect: Mood normal.         Behavior: Behavior normal. Behavior is cooperative.         CRANIAL NERVES     CN III, IV, VI   Pupils are equal, round, and reactive to light.     Significant Labs: All pertinent labs within the past 24 hours have been reviewed.  CBC:   Recent Labs   Lab 05/04/23  0124   WBC 11.65   HGB 7.8*   HCT 23.9*        CMP:   Recent Labs   Lab 05/04/23  0124      K 3.5      CO2 22*   *   BUN 24   CREATININE 1.5*   CALCIUM 8.3*   PROT 6.5   ALBUMIN 2.9*   BILITOT 0.8   ALKPHOS 95   AST 20   ALT 11   ANIONGAP 10     Coagulation:   Recent Labs   Lab 05/04/23  0124   INR 1.2       Significant Imaging: I have reviewed all pertinent imaging results/findings within the past 24 hours.  Xray left hip: no acute findings    Assessment/Plan:     * Frequent falls    Admit to observation  Family and patient are concerned with lack of  ability to care for self at home, patient has 89 year old wife who has multiple medical problems as well, and is unable to care for him  Of note: Was at [facility] in [place] after hip surgery, began to develop some behavioral issues and was transferred to [facility]. [facility] discharged back to [facility], but [facility] would not accept, causing the patient to return home with no DME or home health services including a hospital bed, walker or wheelchair    Fall precautions  Assist with ADLs  PT/OT consult    Case management consult-patient wants to go home and does not want to live in a nursing home. May be open to a brief stay in a rehab with the end result being discharged to home with appropriate services and equipment. Family needs education on caring for him as well    Anemia    Patient's anemia is currently controlled. Has not received any PRBCs to date.. Etiology likely d/t chronic disease  Current CBC reviewed-   Lab Results   Component Value Date    HGB 7.8 (L) 05/04/2023    HCT 23.9 (L) 05/04/2023     Monitor serial CBC and transfuse if patient becomes hemodynamically unstable, symptomatic or H/H drops below 7/21.       Malnutrition of moderate degree  Nutrition consulted. Most recent weight and BMI monitored-     Measurements:  Wt Readings from Last 1 Encounters:   05/04/23 94.3 kg (208 lb)   Body mass index is 29.01 kg/m².    Patient has been screened and assessed by RD.    Malnutrition Type:  Context:    Level:      Malnutrition Characteristic Summary:       Interventions/Recommendations (treatment strategy):       Ulcer of left heel    Noted, chronic  Healing, improved per family  Wound care      Dementia with behavioral disturbance    Noted, chronic  Delirium precautions  Fall precautions  Continue namenda and seroquel    Stage 3 chronic kidney disease    Creatine stable for now. BMP reviewed- noted Estimated Creatinine Clearance: 37.6 mL/min (A) (based on SCr of 1.5 mg/dL (H)). according to latest data.  Monitor UOP and serial BMP and adjust therapy as needed. Renally dose meds.          S/P placement of cardiac pacemaker    Noted, on tele    Paroxysmal atrial fibrillation  Admit to tele  NSR on cm  Anticoagulated with Eliquis.        Hyperlipidemia     Patient is chronically on statin.will continue for now. Monitor clinically. Last LDL was No results found for: LDLCALC       Benign prostatic hyperplasia    Noted, chronic  Continue flomax    Benign essential hypertension    Chronic, controlled.  Latest blood pressure and vitals reviewed-   Temp:  [98.4 °F (36.9 °C)]   Pulse:  [60-65]   Resp:  [18]   BP: (122-131)/(60-70)   SpO2:  [98 %-99 %] .   Home meds for hypertension were reviewed and noted below.   Hypertension Medications               carvediloL (COREG) 6.25 MG tablet Take 6.25 mg by mouth 2 (two) times daily with meals.    furosemide (LASIX) 40 MG tablet Take 40 mg by mouth 2 (two) times daily.            While in the hospital, will manage blood pressure as follows; Continue home antihypertensive regimen    Will utilize p.r.n. blood pressure medication only if patient's blood pressure greater than  180/110 and he develops symptoms such as worsening chest pain or shortness of breath.        VTE Risk Mitigation (From admission, onward)           Ordered     apixaban tablet 5 mg  2 times daily         05/04/23 0519     IP VTE HIGH RISK PATIENT  Once         05/04/23 0519     Place sequential compression device  Until discontinued         05/04/23 0519     Reason for No Pharmacological VTE Prophylaxis  Once        Question:  Reasons:  Answer:  Already adequately anticoagulated on oral Anticoagulants    05/04/23 0519                         TAMMIE Roberts  Department of Hospital Medicine  [facility]    Addendum:  Patient seen and examined. I agree with the findings, assessment and plan as outlined in the note by the nursing practitioner. A substantive portion of H+P was performed by me.   Patient is a pleasant 89-year-old  male with past medical history significant for hypertension, hyperlipidemia, dementia, chronic atrial fibrillation, history of DVT status post permanent pacemaker placement who recently underwent left hip surgery followed by Skilled Nursing Facility placement and subsequently went to Behavioral Health unit for dementia with behavioral disturbance..  Patient reports recent fall few days ago but denies any injury, head injury, loss of consciousness.  Patient does report generalized weakness and possible decreased oral intake.  No recent fevers or chills reported.    On exam patient is answering questions appropriately.  No acute distress noted.  Alert and oriented in times face and person.  Laboratory results are significant for hypokalemia potassium 3.4 and serum creatinine elevated 1.5.  Patient is anemic with hemoglobin 7.7.  Patient denies any blood loss.  Patient likely has mixed combined iron deficiency anemia and vitamin B12 deficiency anemia.    Plan would include urine analysis.  Continue IV fluid hydration.  Replete potassium chloride.  Start physical therapy.  Observe fall precautions.  Coreg dose has been reduced.  Patient is not orthostatic at this time.  Discontinued Lasix therapy.  Encouraged improve oral intake.  If patient remains symptomatic, patient will benefit with packed red blood cell transfusion.  Continue supportive care and maintain patient on gastric ulcer prophylaxis and deep venous thrombosis prophylaxis during this hospitalization.

## 2023-05-04 NOTE — PLAN OF CARE
[facility]  Initial Discharge Assessment       Primary Care Provider: [employer]    Admission Diagnosis: Fall [W19.XXXA]  Frequent falls [R29.6]  Injury of left hip, initial encounter [S79.912A]    Admission Date: 5/4/2023  Expected Discharge Date:     Discharge Barriers Identified: No family/friends to help, Social    Payor: [employer] / Plan: [facility] [employer] / Product Type: Government /     Extended Emergency Contact Information  Primary Emergency Contact: JOSE GUADALUPE THURMAN  Mobile Phone: [phone]  Relation: Daughter  Preferred language: English   needed? No  Secondary Emergency Contact: MARY KAY BARBER  Mobile Phone: [phone]  Relation: Daughter  Preferred language: English   needed? No    Discharge Plan A: Skilled Nursing Facility  Discharge Plan B: New Nursing Home placement - halfway care facility    No Pharmacies Listed    Completed DC assessment over the phone with pt's daughterJose Guadalupe. Verified information on facesheet as correct. Reports that patient has been back and forth between listed address, spouse's address in [place], and hospital/SNF's. [facility]. Unsure of which pharmacy but likely will be [facility] in [place]. On eliquis prior to admit. DME- cane. Reports patient has not been walking on own since Jan when initial hip sx occurred. Reports family cannot care for him and needs SNF placement. Verified insurance. DC plan is SNF VS halfway     Initial Assessment (most recent)       Adult Discharge Assessment - 05/04/23 1434          Discharge Assessment    Assessment Type Discharge Planning Assessment     Confirmed/corrected address, phone number and insurance Yes     Confirmed Demographics Correct on Facesheet     Source of Information family     Does patient/caregiver understand observation status Yes     Communicated LILIANA with patient/caregiver Yes     Reason For Admission frequent falls     People  in Home spouse     Facility Arrived From: ER     Do you expect to return to your current living situation? No     Do you have help at home or someone to help you manage your care at home? No     Prior to hospitilization cognitive status: Alert/Oriented     Current cognitive status: Alert/Oriented     Equipment Currently Used at Home cane, straight     Readmission within 30 days? No     Patient currently being followed by outpatient case management? No     Do you currently have service(s) that help you manage your care at home? No     Do you take prescription medications? Yes     Do you have prescription coverage? Yes     Do you have any problems affording any of your prescribed medications? No     Is the patient taking medications as prescribed? yes     Are you on dialysis? No     Do you take coumadin? No     Discharge Plan A Skilled Nursing Facility     Discharge Plan B New Nursing Home placement - correction care facility     DME Needed Upon Discharge  walker, rolling;bedside commode     Discharge Plan discussed with: POA     Discharge Barriers Identified No family/friends to help;Social

## 2023-05-04 NOTE — ASSESSMENT & PLAN NOTE
Patient's anemia is currently controlled. Has not received any PRBCs to date.. Etiology likely d/t chronic disease  Current CBC reviewed-   Lab Results   Component Value Date    HGB 7.8 (L) 05/04/2023    HCT 23.9 (L) 05/04/2023     Monitor serial CBC and transfuse if patient becomes hemodynamically unstable, symptomatic or H/H drops below 7/21.

## 2023-05-04 NOTE — NURSING
Nurses Note -- 4 Eyes      5/4/2023   7:38 AM      Skin assessed during: Admit      [] No Altered Skin Integrity Present    []Prevention Measures Documented      [x] Yes- Altered Skin Integrity Present or Discovered   [x] LDA Added if Not in Epic (Describe Wound)   [x] New Altered Skin Integrity was Present on Admit and Documented in LDA   [x] Wound Image Taken    Wound Care Consulted? Yes    Attending Nurse:  Mayda Fernandez LPN     Second RN/Staff Member:  Jayshree ludwig RN

## 2023-05-04 NOTE — PLAN OF CARE
LOCET called in, faxed completed PSSR to [facility]. Awaiting [phone]

## 2023-05-04 NOTE — PLAN OF CARE
Recommendations  1) Continue Cardiac diet  2) Send Boost plus 1 x daily + Mirza BID   3) weigh weekly    Goals: 1) PO Intakes > 50% of meals and supplements at f/u  Nutrition Goal Status: new  Communication of RD Recs:  (POC, sticky note)

## 2023-05-04 NOTE — ASSESSMENT & PLAN NOTE
Admit to observation  Family and patient are concerned with lack of ability to care for self at home, patient has 89 year old wife who has multiple medical problems as well, and is unable to care for him  Of note: Was at [facility] in [place] after hip surgery, began to develop some behavioral issues and was transferred to [facility]. [facility] discharged back to [facility], but [facility] would not accept, causing the patient to return home with no DME or home health services including a hospital bed, walker or wheelchair    Fall precautions  Assist with ADLs  PT/OT consult    Case management consult-patient wants to go home and does not want to live in a nursing home. May be open to a brief stay in a rehab with the end result being discharged to home with appropriate services and equipment. Family needs education on caring for him as well

## 2023-05-04 NOTE — PT/OT/SLP PROGRESS
Occupational Therapy      Patient Name:  Babak Nunez   MRN:  [id number]    Patient not seen today secondary to Patient unwilling to participate, Other (Comment) (OT attempted AM & PM.). Will follow up.    5/4/2023

## 2023-05-04 NOTE — ASSESSMENT & PLAN NOTE
Creatine stable for now. BMP reviewed- noted Estimated Creatinine Clearance: 37.6 mL/min (A) (based on SCr of 1.5 mg/dL (H)). according to latest data. Monitor UOP and serial BMP and adjust therapy as needed. Renally dose meds.

## 2023-05-04 NOTE — ED PROVIDER NOTES
Encounter Date: 5/4/2023    SCRIBE #1 NOTE: I, Chrissy Lopez, am scribing for, and in the presence of,  Rory Esteves MD.     History     Chief Complaint   Patient presents with    Fall     Per ems, fell out of bed going to bathroom.  Left hip pain, no LOC.  + shortening of left leg.  Pt. On blood thinners.       Time seen by provider: 12:36 AM on 05/04/2023    Babak Nunez is a 89 y.o. male who presents to the ED via EMS from home with an onset of left hip pain s/p fall shortly PTA. History obtained from independent historian: EMS personnel state the patient fell out of bed, and he injured his left hip. EMS note the patient had a left hip arthroplasty scheduled for tomorrow. The patient denies other injury. PMHx of A-Fib, left hip fracture (01/2023), sever aortic stenosis, HTN, CKD, mild dementia, and hyperlipidemia. No known pertinent PSHx.       The history is provided by the patient and the EMS personnel.   Review of patient's allergies indicates:  No Known Allergies  Past Medical History:   Diagnosis Date    Afib     Hyperlipemia      History reviewed. No pertinent surgical history.  No family history on file.  Social History     Tobacco Use    Smoking status: Never   Substance Use Topics    Alcohol use: Yes     Comment: 2beers/day     Review of Systems   Constitutional: Negative.    HENT: Negative.     Eyes: Negative.    Respiratory: Negative.     Cardiovascular: Negative.    Gastrointestinal: Negative.    Endocrine: Negative.    Genitourinary: Negative.    Musculoskeletal:  Positive for arthralgias (L hip).   Skin: Negative.    Allergic/Immunologic: Negative.    Neurological: Negative.    Hematological: Negative.    Psychiatric/Behavioral: Negative.       Physical Exam     Initial Vitals [05/04/23 0040]   BP Pulse Resp Temp SpO2   131/70 60 18 98.4 °F (36.9 °C) 98 %      MAP       --         Physical Exam    Nursing note and vitals reviewed.  Constitutional: Vital signs are normal. He appears  well-developed. He is active and cooperative.   HENT:   Head: Normocephalic and atraumatic.   Eyes: Conjunctivae, EOM and lids are normal. Pupils are equal, round, and reactive to light.   Neck: Trachea normal. Neck supple. No thyroid mass present.    Full passive range of motion without pain.     Cardiovascular:  Normal rate, regular rhythm, S1 normal, S2 normal, normal heart sounds, intact distal pulses and normal pulses.           Pulses:       Dorsalis pedis pulses are 2+ on the left side.        Posterior tibial pulses are 2+ on the left side.   Abdominal: Abdomen is soft. Bowel sounds are normal.   Musculoskeletal:         General: Normal range of motion.      Cervical back: Full passive range of motion without pain and neck supple.      Comments: Left leg shortening and rotation noted on exam.      Lymphadenopathy:     He has no axillary adenopathy.   Neurological: He is alert and oriented to person, place, and time.   Skin: Skin is warm, dry and intact.   Psychiatric: He has a normal mood and affect. His speech is normal and behavior is normal. Judgment and thought content normal. Cognition and memory are normal.       ED Course   Procedures  Labs Reviewed   CBC W/ AUTO DIFFERENTIAL - Abnormal; Notable for the following components:       Result Value    RBC 2.47 (*)     Hemoglobin 7.8 (*)     Hematocrit 23.9 (*)     MCH 31.6 (*)     RDW 15.5 (*)     MPV 8.8 (*)     Mono # 1.6 (*)     All other components within normal limits   COMPREHENSIVE METABOLIC PANEL - Abnormal; Notable for the following components:    CO2 22 (*)     Glucose 116 (*)     Creatinine 1.5 (*)     Calcium 8.3 (*)     Albumin 2.9 (*)     eGFR 44 (*)     All other components within normal limits   PROTIME-INR - Abnormal; Notable for the following components:    Prothrombin Time 13.0 (*)     All other components within normal limits        Results for orders placed or performed during the hospital encounter of 05/04/23   CBC auto differential    Result Value Ref Range    WBC 11.65 3.90 - 12.70 K/uL    RBC 2.47 (L) 4.60 - 6.20 M/uL    Hemoglobin 7.8 (L) 14.0 - 18.0 g/dL    Hematocrit 23.9 (L) 40.0 - 54.0 %    MCV 97 82 - 98 fL    MCH 31.6 (H) 27.0 - 31.0 pg    MCHC 32.6 32.0 - 36.0 g/dL    RDW 15.5 (H) 11.5 - 14.5 %    Platelets 239 150 - 450 K/uL    MPV 8.8 (L) 9.2 - 12.9 fL    Immature Granulocytes 0.3 0.0 - 0.5 %    Gran # (ANC) 7.1 1.8 - 7.7 K/uL    Immature Grans (Abs) 0.03 0.00 - 0.04 K/uL    Lymph # 2.4 1.0 - 4.8 K/uL    Mono # 1.6 (H) 0.3 - 1.0 K/uL    Eos # 0.4 0.0 - 0.5 K/uL    Baso # 0.07 0.00 - 0.20 K/uL    nRBC 0 0 /100 WBC    Gran % 61.1 38.0 - 73.0 %    Lymph % 20.7 18.0 - 48.0 %    Mono % 14.1 4.0 - 15.0 %    Eosinophil % 3.2 0.0 - 8.0 %    Basophil % 0.6 0.0 - 1.9 %    Differential Method Automated    Comprehensive metabolic panel   Result Value Ref Range    Sodium 138 136 - 145 mmol/L    Potassium 3.5 3.5 - 5.1 mmol/L    Chloride 106 95 - 110 mmol/L    CO2 22 (L) 23 - 29 mmol/L    Glucose 116 (H) 70 - 110 mg/dL    BUN 24 10 - 30 mg/dL    Creatinine 1.5 (H) 0.5 - 1.4 mg/dL    Calcium 8.3 (L) 8.7 - 10.5 mg/dL    Total Protein 6.5 6.0 - 8.4 g/dL    Albumin 2.9 (L) 3.5 - 5.2 g/dL    Total Bilirubin 0.8 0.1 - 1.0 mg/dL    Alkaline Phosphatase 95 55 - 135 U/L    AST 20 10 - 40 U/L    ALT 11 10 - 44 U/L    Anion Gap 10 8 - 16 mmol/L    eGFR 44 (A) >60 mL/min/1.73 m^2   Protime-INR   Result Value Ref Range    Prothrombin Time 13.0 (H) 9.0 - 12.5 sec    INR 1.2 0.8 - 1.2     Imaging Results              X-Ray Hip 2 or 3 views Left (with Pelvis when performed) (In process)                       Imaging Results              X-Ray Hip 2 or 3 views Left (with Pelvis when performed) (In process)                      Medications - No data to display  Medical Decision Making:   History:   Old Medical Records: I decided to obtain old medical records.  Clinical Tests:   Lab Tests: Ordered and Reviewed  Radiological Study: Ordered and Reviewed  ED  Management:  This patient has chronic left hip problems with multiple dislocations.  The plan was to have the patient have a complete hip replacement but they kept on running into barriers such as infection and placement in an appropriate facility.  Patient just recently was sent home from the nursing facility that was doing his rehab.  This is done suddenly in the family roughly had 6 hours to figure out what to do with the patient.  Today the patient presents with the mother fall this time without any fracture subluxation or dislocation.  I am very concerned about the well-being of this individual as I believe that the patient needs to be in a nursing facility where they can be appropriately cared for.  Patient is at high risk for fall to be at home so subsequently like to have the patient admitted to the hospital and consult  in order to get the patient placed in an appropriate facility for rehabilitation.        Scribe Attestation:   Scribe #1: I performed the above scribed service and the documentation accurately describes the services I performed. I attest to the accuracy of the note.                 This document was produced by a scribe under my direction and in my presence. I agree with the content of the note and have made any necessary edits.     Rory Esteves MD    05/04/2023 4:01 AM    Clinical Impression:   Final diagnoses:  [W19.XXXA] Fall  [R29.6] Frequent falls        ED Disposition Condition    Observation                 Rory Esteves MD  05/04/23 7780

## 2023-05-04 NOTE — ASSESSMENT & PLAN NOTE
Malnutrition Type:  Context: chronic illness  Level: moderate    Related to (etiology):   Inadequate energy/protein intake    Signs and Symptoms (as evidenced by):   Wound  Malnutrition Characteristic Summary:  Subcutaneous Fat (Malnutrition): mild depletion  Muscle Mass (Malnutrition): mild depletion  Fluid Accumulation (Malnutrition): moderate (3+)      Interventions/Recommendations (treatment strategy):  Fat/sodium modified diet and nutrition supplement therapy  1) Continue Cardiac diet 2) Send Boost plus 1 x daily 3) weigh weekly    Nutrition Diagnosis Status:   new

## 2023-05-04 NOTE — NURSING
Awake alert and oriented to person and place. Wiggles toes bilateral with swelling to LLE.. Wound to lt heel about quarter size, elevated on pillow with heel floating. Equal, weak  noted. Daughter's at bedside. Questions answered and encouraged.

## 2023-05-05 LAB
ABO + RH BLD: NORMAL
ALBUMIN SERPL BCP-MCNC: 2.5 G/DL (ref 3.5–5.2)
ALP SERPL-CCNC: 85 U/L (ref 55–135)
ALT SERPL W/O P-5'-P-CCNC: 10 U/L (ref 10–44)
ANION GAP SERPL CALC-SCNC: 9 MMOL/L (ref 8–16)
AST SERPL-CCNC: 19 U/L (ref 10–40)
BASOPHILS # BLD AUTO: 0.04 K/UL (ref 0–0.2)
BASOPHILS NFR BLD: 0.5 % (ref 0–1.9)
BILIRUB SERPL-MCNC: 0.7 MG/DL (ref 0.1–1)
BLD GP AB SCN CELLS X3 SERPL QL: NORMAL
BLD PROD TYP BPU: NORMAL
BLOOD UNIT EXPIRATION DATE: NORMAL
BLOOD UNIT TYPE CODE: 9500
BLOOD UNIT TYPE: NORMAL
BUN SERPL-MCNC: 23 MG/DL (ref 10–30)
CALCIUM SERPL-MCNC: 8.3 MG/DL (ref 8.7–10.5)
CHLORIDE SERPL-SCNC: 108 MMOL/L (ref 95–110)
CO2 SERPL-SCNC: 23 MMOL/L (ref 23–29)
CODING SYSTEM: NORMAL
CREAT SERPL-MCNC: 1.4 MG/DL (ref 0.5–1.4)
CROSSMATCH INTERPRETATION: NORMAL
DIFFERENTIAL METHOD: ABNORMAL
DISPENSE STATUS: NORMAL
EOSINOPHIL # BLD AUTO: 0.6 K/UL (ref 0–0.5)
EOSINOPHIL NFR BLD: 6.7 % (ref 0–8)
ERYTHROCYTE [DISTWIDTH] IN BLOOD BY AUTOMATED COUNT: 15.4 % (ref 11.5–14.5)
EST. GFR  (NO RACE VARIABLE): 47 ML/MIN/1.73 M^2
GLUCOSE SERPL-MCNC: 102 MG/DL (ref 70–110)
HCT VFR BLD AUTO: 21.9 % (ref 40–54)
HGB BLD-MCNC: 7 G/DL (ref 14–18)
IMM GRANULOCYTES # BLD AUTO: 0.02 K/UL (ref 0–0.04)
IMM GRANULOCYTES NFR BLD AUTO: 0.2 % (ref 0–0.5)
LYMPHOCYTES # BLD AUTO: 2.2 K/UL (ref 1–4.8)
LYMPHOCYTES NFR BLD: 26.2 % (ref 18–48)
MCH RBC QN AUTO: 31.3 PG (ref 27–31)
MCHC RBC AUTO-ENTMCNC: 32 G/DL (ref 32–36)
MCV RBC AUTO: 98 FL (ref 82–98)
MONOCYTES # BLD AUTO: 1.2 K/UL (ref 0.3–1)
MONOCYTES NFR BLD: 13.9 % (ref 4–15)
NEUTROPHILS # BLD AUTO: 4.4 K/UL (ref 1.8–7.7)
NEUTROPHILS NFR BLD: 52.5 % (ref 38–73)
NRBC BLD-RTO: 0 /100 WBC
NUM UNITS TRANS PACKED RBC: NORMAL
PLATELET # BLD AUTO: 236 K/UL (ref 150–450)
PMV BLD AUTO: 9.5 FL (ref 9.2–12.9)
POTASSIUM SERPL-SCNC: 3.5 MMOL/L (ref 3.5–5.1)
PROT SERPL-MCNC: 5.8 G/DL (ref 6–8.4)
RBC # BLD AUTO: 2.24 M/UL (ref 4.6–6.2)
SODIUM SERPL-SCNC: 140 MMOL/L (ref 136–145)
SPECIMEN OUTDATE: NORMAL
WBC # BLD AUTO: 8.46 K/UL (ref 3.9–12.7)

## 2023-05-05 PROCEDURE — P9016 RBC LEUKOCYTES REDUCED: HCPCS | Performed by: HOSPITALIST

## 2023-05-05 PROCEDURE — 86920 COMPATIBILITY TEST SPIN: CPT | Performed by: HOSPITALIST

## 2023-05-05 PROCEDURE — 97535 SELF CARE MNGMENT TRAINING: CPT

## 2023-05-05 PROCEDURE — 99222 1ST HOSP IP/OBS MODERATE 55: CPT | Mod: ICN,,, | Performed by: FAMILY MEDICINE

## 2023-05-05 PROCEDURE — 36430 TRANSFUSION BLD/BLD COMPNT: CPT | Mod: 59

## 2023-05-05 PROCEDURE — 25000003 PHARM REV CODE 250: Performed by: NURSE PRACTITIONER

## 2023-05-05 PROCEDURE — 36415 COLL VENOUS BLD VENIPUNCTURE: CPT | Performed by: HOSPITALIST

## 2023-05-05 PROCEDURE — 85025 COMPLETE CBC W/AUTO DIFF WBC: CPT | Performed by: NURSE PRACTITIONER

## 2023-05-05 PROCEDURE — 25000003 PHARM REV CODE 250: Performed by: INTERNAL MEDICINE

## 2023-05-05 PROCEDURE — 36415 COLL VENOUS BLD VENIPUNCTURE: CPT | Performed by: NURSE PRACTITIONER

## 2023-05-05 PROCEDURE — 80053 COMPREHEN METABOLIC PANEL: CPT | Performed by: NURSE PRACTITIONER

## 2023-05-05 PROCEDURE — 99222 PR INITIAL HOSPITAL CARE,LEVL II: ICD-10-PCS | Mod: ,,, | Performed by: FAMILY MEDICINE

## 2023-05-05 PROCEDURE — 97165 OT EVAL LOW COMPLEX 30 MIN: CPT

## 2023-05-05 PROCEDURE — 86920 COMPATIBILITY TEST SPIN: CPT | Performed by: INTERNAL MEDICINE

## 2023-05-05 PROCEDURE — 86900 BLOOD TYPING SEROLOGIC ABO: CPT | Performed by: HOSPITALIST

## 2023-05-05 PROCEDURE — 97110 THERAPEUTIC EXERCISES: CPT | Mod: CQ

## 2023-05-05 PROCEDURE — 96361 HYDRATE IV INFUSION ADD-ON: CPT

## 2023-05-05 PROCEDURE — G0378 HOSPITAL OBSERVATION PER HR: HCPCS

## 2023-05-05 RX ORDER — HYDROCODONE BITARTRATE AND ACETAMINOPHEN 500; 5 MG/1; MG/1
TABLET ORAL
Status: DISCONTINUED | OUTPATIENT
Start: 2023-05-05 | End: 2023-05-08 | Stop reason: HOSPADM

## 2023-05-05 RX ADMIN — TAMSULOSIN HYDROCHLORIDE 0.4 MG: 0.4 CAPSULE ORAL at 09:05

## 2023-05-05 RX ADMIN — MIRTAZAPINE 15 MG: 15 TABLET, FILM COATED ORAL at 09:05

## 2023-05-05 RX ADMIN — MEMANTINE 5 MG: 5 TABLET ORAL at 09:05

## 2023-05-05 RX ADMIN — APIXABAN 5 MG: 2.5 TABLET, FILM COATED ORAL at 09:05

## 2023-05-05 RX ADMIN — ASPIRIN 81 MG: 81 TABLET, COATED ORAL at 09:05

## 2023-05-05 RX ADMIN — CARVEDILOL 3.12 MG: 3.12 TABLET, FILM COATED ORAL at 09:05

## 2023-05-05 RX ADMIN — QUETIAPINE 50 MG: 25 TABLET ORAL at 09:05

## 2023-05-05 RX ADMIN — CARVEDILOL 3.12 MG: 3.12 TABLET, FILM COATED ORAL at 05:05

## 2023-05-05 RX ADMIN — ATORVASTATIN CALCIUM 40 MG: 40 TABLET, FILM COATED ORAL at 09:05

## 2023-05-05 RX ADMIN — CYANOCOBALAMIN TAB 1000 MCG 1000 MCG: 1000 TAB at 09:05

## 2023-05-05 NOTE — PLAN OF CARE
SNF auth obtained from [facility] for [facility]. Auth # [id number].  CM left VM for Elinor at [facility] to see if able to accept today.     [facility] until Monday due to my request coming last yesterday and their MD didn't get to until after [facility] already reviewed charts for the day.     Last CM spoke with daughter she has not decided on facility and will not agree to any without viewing facilities first. I again explained that once medically cleared (which is today after blood transfusion) will have to go to first available or home and again declined for patient to go anywhere but a facility she has viewed. She did tour [place] [facility] and said no; states she was going to view [facility], [facility], and [facility]       05/05/23 1522   Post-Acute Status   Post-Acute Authorization Placement   Post-Acute Placement Status Set-up Complete/Auth obtained   Discharge Delays (!) Patient and Family Barriers

## 2023-05-05 NOTE — PLAN OF CARE
Patient accepted at [facility] pending financials. Daisy to call pt's daughter to discuss.... [facility]    Pt also accepted at [facility].. pt only has 7 SNF days left and will have to pay copay because [facility] not [facility]. Eilnor at  to call pt's daughter.              05/05/23 0953   Post-Acute Status   Post-Acute Authorization Placement   Post-Acute Placement Status Set-up Complete/Auth obtained

## 2023-05-05 NOTE — PROGRESS NOTES
[facility] Hospital Medicine  Progress Note    Patient Name: Babak Nunez  MRN: [id number]  Patient Class: OP- Observation   Admission Date: 5/4/2023  Length of Stay: 0 days  Attending Physician: Christal Radford MD  Primary Care Provider: [facility]        Subjective:     Principal Problem:Frequent falls        HPI:  Babak Nunez is a 89 year old male with a past medical history of Afib, HTN, HLD, dementia, BPH, DVT and pacemaker placement, who presented to the ED with a complaint of left hip pain after sustaining a fall at home. HPI per family and patient. His family reports that he has been having difficulty with his left hip, which has been coming out of socket more frequently. He was due to have surgery on it today but due to recent events was cancelled. According to his daughters, after a recent admission at [facility], he was sent to [facility] in [place] after hip surgery. They were told that in order to discharge him back home, they would be setting up a hospital bed, walker and wheelchair, as well as other home services to provide for a safe discharge. He began to develop some behavioral issues and was transferred to [facility], where he was diagnosed with early dementia. [facility] discharged the patient back to [facility], but [facility] would not accept for unclear reasons, causing the patient to return home with no DME or home health services. He states he does not want to live in a nursing home, and wants to be at his own house. His wife is also elderly with multiple medical issues, and is unable to care for him on her own. The patient currently denies complaint except for some soreness to the left hip. He denies other complaint    ED work up included a CBC, which showed chronic anemia. CMP showed CKD and chronic moderate malnutrition. Xray of the left hip showed no acute fracture or dislocation. Due increase in falls with multiple risk factors for complication, decision made to  admit to observation for PT/OT evaluation and case management consultation. Hospital Medicine consulted for admission and further management.         Overview/Hospital Course:  No notes on file    Interval History: Patient seen and examined. Hgb 7 today, feels fatigued. Consent obtained. Will transfuse 1 unit PRBCs. CM working on SNF placement.    Review of Systems   Constitutional:  Positive for fatigue. Negative for chills and fever.   HENT:  Negative for congestion and sore throat.    Eyes:  Negative for visual disturbance.   Respiratory:  Negative for cough and shortness of breath.    Cardiovascular:  Negative for chest pain and palpitations.   Gastrointestinal:  Negative for abdominal pain, constipation, diarrhea, nausea and vomiting.   Endocrine: Negative for cold intolerance and heat intolerance.   Genitourinary:  Negative for dysuria and hematuria.   Musculoskeletal:  Positive for arthralgias and gait problem (frequent falls). Negative for myalgias.   Skin:  Negative for rash.   Neurological:  Negative for dizziness, tremors and seizures.   Hematological:  Negative for adenopathy. Does not bruise/bleed easily.   All other systems reviewed and are negative.  Objective:     Vital Signs (Most Recent):  Temp: 97 °F (36.1 °C) (05/05/23 1123)  Pulse: 60 (05/05/23 1123)  Resp: 16 (05/05/23 1123)  BP: (!) 123/57 (05/05/23 1123)  SpO2: 100 % (05/05/23 1123) Vital Signs (24h Range):  Temp:  [97 °F (36.1 °C)-98.6 °F (37 °C)] 97 °F (36.1 °C)  Pulse:  [60-65] 60  Resp:  [16-18] 16  SpO2:  [95 %-100 %] 100 %  BP: (109-136)/(54-65) 123/57     Weight: 76.2 kg (167 lb 15.9 oz)  Body mass index is 24.1 kg/m².    Intake/Output Summary (Last 24 hours) at 5/5/2023 1157  Last data filed at 5/5/2023 0931  Gross per 24 hour   Intake 1860 ml   Output 1950 ml   Net -90 ml         Physical Exam  Vitals and nursing note reviewed.   Constitutional:       General: He is awake. He is not in acute distress.     Appearance: Normal  appearance. He is well-developed and overweight. He is ill-appearing (elderly).   HENT:      Head: Normocephalic and atraumatic.      Nose: Nose normal. No septal deviation.      Mouth/Throat:      Lips: Pink.      Mouth: Mucous membranes are moist.   Eyes:      Conjunctiva/sclera: Conjunctivae normal.      Pupils: Pupils are equal, round, and reactive to light.   Neck:      Thyroid: No thyroid mass.      Vascular: No JVD.      Trachea: No tracheal tenderness or tracheal deviation.   Cardiovascular:      Rate and Rhythm: Normal rate and regular rhythm.      Heart sounds: S1 normal and S2 normal. No murmur heard.    No friction rub. No gallop.   Pulmonary:      Effort: Pulmonary effort is normal.      Breath sounds: Normal breath sounds. No decreased breath sounds, wheezing, rhonchi or rales.   Abdominal:      General: Bowel sounds are normal. There is no distension.      Palpations: Abdomen is soft. There is no hepatomegaly, splenomegaly or mass.      Tenderness: There is no abdominal tenderness.   Musculoskeletal:      Cervical back: Full passive range of motion without pain, normal range of motion and neck supple.      Left hip: Decreased range of motion.      Left lower leg: Edema present.   Skin:     General: Skin is warm.      Coloration: Skin is pale.      Findings: Wound present. No rash.          Neurological:      General: No focal deficit present.      Mental Status: He is alert and oriented to person, place, and time.      Cranial Nerves: No cranial nerve deficit.      Sensory: No sensory deficit.   Psychiatric:         Mood and Affect: Mood normal.         Behavior: Behavior normal. Behavior is cooperative.           Significant Labs: All pertinent labs within the past 24 hours have been reviewed.    Significant Imaging: I have reviewed all pertinent imaging results/findings within the past 24 hours.      Assessment/Plan:      * Frequent falls  CM working on SNF  Fall precautions  Assist with ADLs  PT/OT  consult      Anemia    Patient's anemia is currently uncontrolled.  Has not received any PRBCs to date.. Etiology likely d/t chronic disease  Current CBC reviewed-   Lab Results   Component Value Date    HGB 7.0 (L) 05/05/2023    HCT 21.9 (L) 05/05/2023     Monitor serial CBC and transfuse if patient becomes hemodynamically unstable, symptomatic or H/H drops below 7/21.   5/5 Transfuse one unit PRBCs      Malnutrition of moderate degree  Nutrition consulted. Most recent weight and BMI monitored-     Measurements:  Wt Readings from Last 1 Encounters:   05/04/23 76.2 kg (167 lb 15.9 oz)   Body mass index is 24.1 kg/m².    Patient has been screened and assessed by RD.    Malnutrition Type:  Context: chronic illness  Level: moderate    Malnutrition Characteristic Summary:  Subcutaneous Fat (Malnutrition): mild depletion  Muscle Mass (Malnutrition): mild depletion  Fluid Accumulation (Malnutrition): moderate (3+)    Interventions/Recommendations (treatment strategy):  1) Continue Cardiac diet 2) Send Boost plus 1 x daily 3) weigh weekly    Ulcer of left heel    Noted, chronic  Healing, improved per family  Wound care      Dementia with behavioral disturbance    Noted, chronic  Delirium precautions  Fall precautions  Continue namenda and seroquel    Stage 3 chronic kidney disease    Creatine stable for now. BMP reviewed- noted Estimated Creatinine Clearance: 35.5 mL/min (based on SCr of 1.4 mg/dL). according to latest data. Monitor UOP and serial BMP and adjust therapy as needed. Renally dose meds.          S/P placement of cardiac pacemaker    Noted, on tele    Paroxysmal atrial fibrillation  Admit to tele  NSR on cm  Anticoagulated with Eliquis.        Hyperlipidemia     Patient is chronically on statin.will continue for now. Monitor clinically. Last LDL was No results found for: LDLCALC       Benign prostatic hyperplasia    Noted, chronic  Continue flomax    Benign essential hypertension    Chronic, controlled.  Latest  blood pressure and vitals reviewed-   Temp:  [97 °F (36.1 °C)-98.6 °F (37 °C)]   Pulse:  [60-65]   Resp:  [16-18]   BP: (109-136)/(54-65)   SpO2:  [95 %-100 %] .   Home meds for hypertension were reviewed and noted below.   Hypertension Medications             carvediloL (COREG) 6.25 MG tablet Take 6.25 mg by mouth 2 (two) times daily with meals.    furosemide (LASIX) 40 MG tablet Take 40 mg by mouth 2 (two) times daily.          While in the hospital, will manage blood pressure as follows; Continue home antihypertensive regimen    Will utilize p.r.n. blood pressure medication only if patient's blood pressure greater than  180/110 and he develops symptoms such as worsening chest pain or shortness of breath.        VTE Risk Mitigation (From admission, onward)         Ordered     apixaban tablet 5 mg  2 times daily         05/04/23 0519     IP VTE HIGH RISK PATIENT  Once         05/04/23 0519     Place sequential compression device  Until discontinued         05/04/23 0519     Reason for No Pharmacological VTE Prophylaxis  Once        Question:  Reasons:  Answer:  Already adequately anticoagulated on oral Anticoagulants    05/04/23 0519                Discharge Planning   LILIANA: 5/5/2023     Code Status: DNR   Is the patient medically ready for discharge?:     Reason for patient still in hospital (select all that apply): Patient trending condition and Treatment  Discharge Plan A: Skilled Nursing Facility                  Christal Radford MD  Department of Hospital Medicine   [facility]

## 2023-05-05 NOTE — SUBJECTIVE & OBJECTIVE
Interval History: Patient seen and examined. Hgb 7 today, feels fatigued. Consent obtained. Will transfuse 1 unit PRBCs. CM working on SNF placement.    Review of Systems   Constitutional:  Positive for fatigue. Negative for chills and fever.   HENT:  Negative for congestion and sore throat.    Eyes:  Negative for visual disturbance.   Respiratory:  Negative for cough and shortness of breath.    Cardiovascular:  Negative for chest pain and palpitations.   Gastrointestinal:  Negative for abdominal pain, constipation, diarrhea, nausea and vomiting.   Endocrine: Negative for cold intolerance and heat intolerance.   Genitourinary:  Negative for dysuria and hematuria.   Musculoskeletal:  Positive for arthralgias and gait problem (frequent falls). Negative for myalgias.   Skin:  Negative for rash.   Neurological:  Negative for dizziness, tremors and seizures.   Hematological:  Negative for adenopathy. Does not bruise/bleed easily.   All other systems reviewed and are negative.  Objective:     Vital Signs (Most Recent):  Temp: 97 °F (36.1 °C) (05/05/23 1123)  Pulse: 60 (05/05/23 1123)  Resp: 16 (05/05/23 1123)  BP: (!) 123/57 (05/05/23 1123)  SpO2: 100 % (05/05/23 1123) Vital Signs (24h Range):  Temp:  [97 °F (36.1 °C)-98.6 °F (37 °C)] 97 °F (36.1 °C)  Pulse:  [60-65] 60  Resp:  [16-18] 16  SpO2:  [95 %-100 %] 100 %  BP: (109-136)/(54-65) 123/57     Weight: 76.2 kg (167 lb 15.9 oz)  Body mass index is 24.1 kg/m².    Intake/Output Summary (Last 24 hours) at 5/5/2023 1157  Last data filed at 5/5/2023 0931  Gross per 24 hour   Intake 1860 ml   Output 1950 ml   Net -90 ml         Physical Exam  Vitals and nursing note reviewed.   Constitutional:       General: He is awake. He is not in acute distress.     Appearance: Normal appearance. He is well-developed and overweight. He is ill-appearing (elderly).   HENT:      Head: Normocephalic and atraumatic.      Nose: Nose normal. No septal deviation.      Mouth/Throat:      Lips:  Pink.      Mouth: Mucous membranes are moist.   Eyes:      Conjunctiva/sclera: Conjunctivae normal.      Pupils: Pupils are equal, round, and reactive to light.   Neck:      Thyroid: No thyroid mass.      Vascular: No JVD.      Trachea: No tracheal tenderness or tracheal deviation.   Cardiovascular:      Rate and Rhythm: Normal rate and regular rhythm.      Heart sounds: S1 normal and S2 normal. No murmur heard.    No friction rub. No gallop.   Pulmonary:      Effort: Pulmonary effort is normal.      Breath sounds: Normal breath sounds. No decreased breath sounds, wheezing, rhonchi or rales.   Abdominal:      General: Bowel sounds are normal. There is no distension.      Palpations: Abdomen is soft. There is no hepatomegaly, splenomegaly or mass.      Tenderness: There is no abdominal tenderness.   Musculoskeletal:      Cervical back: Full passive range of motion without pain, normal range of motion and neck supple.      Left hip: Decreased range of motion.      Left lower leg: Edema present.   Skin:     General: Skin is warm.      Coloration: Skin is pale.      Findings: Wound present. No rash.          Neurological:      General: No focal deficit present.      Mental Status: He is alert and oriented to person, place, and time.      Cranial Nerves: No cranial nerve deficit.      Sensory: No sensory deficit.   Psychiatric:         Mood and Affect: Mood normal.         Behavior: Behavior normal. Behavior is cooperative.           Significant Labs: All pertinent labs within the past 24 hours have been reviewed.    Significant Imaging: I have reviewed all pertinent imaging results/findings within the past 24 hours.

## 2023-05-05 NOTE — CONSULTS
Chief complaint:  Fall (Per ems, fell out of bed going to bathroom.  Left hip pain, no LOC.  + shortening of left leg.  Pt. On blood thinners.  )      HPI:  Babak Nunez is a 89 y.o. male presenting with wounds of the BL heels, and BL buttocks. Pt was admitted for left hip pain after falling at home. On admission he was found to have open wounds from shearing to the BL buttocks and BL heel open wounds. Pt has no other wound complaints today.    PMH:  As per HPI and below:  Past Medical History:   Diagnosis Date    Afib     Anticoagulant long-term use     BPH (benign prostatic hyperplasia)     Hyperlipemia     Hypertension     Renal disorder        Social History     Socioeconomic History    Marital status:    Tobacco Use    Smoking status: Never   Substance and Sexual Activity    Alcohol use: Yes     Comment: 2beers/day       Past Surgical History:   Procedure Laterality Date    CARDIAC SURGERY      HIP REPLACEMENT ARTHROPLASTY Left     INSERTION OF PACEMAKER         No family history on file.    Review of patient's allergies indicates:  No Known Allergies    No current facility-administered medications on file prior to encounter.     Current Outpatient Medications on File Prior to Encounter   Medication Sig Dispense Refill    apixaban (ELIQUIS) 5 mg Tab Take 5 mg by mouth 2 (two) times daily.      aspirin (ECOTRIN) 81 MG EC tablet Take 81 mg by mouth once daily.      ATORVASTATIN CALCIUM (ATORVASTATIN ORAL) Take 10 mg by mouth once daily.      carvediloL (COREG) 6.25 MG tablet Take 6.25 mg by mouth 2 (two) times daily with meals.      furosemide (LASIX) 40 MG tablet Take 40 mg by mouth once daily.      memantine (NAMENDA) 10 MG Tab Take 5 mg by mouth 2 (two) times daily.      mirtazapine (REMERON) 15 MG tablet Take 15 mg by mouth every evening.      nystatin (MYCOSTATIN) cream Apply topically 2 (two) times daily.      QUEtiapine (SEROQUEL) 50 MG tablet Take 50 mg by mouth every evening.      tamsulosin  "(FLOMAX) 0.4 mg Cap Take by mouth once daily.         ROS: As per HPI and below:  Pertinent items are noted in HPI.      Physical Exam:     Vitals:    23 2331 23 0348 23 0729 23 1123   BP: 136/65 (!) 110/56 127/61 (!) 123/57   BP Location: Left arm Right arm Right arm Left arm   Patient Position: Lying Lying Lying Sitting   Pulse: 60 61 60 60   Resp: 18 18 16 16   Temp: 98 °F (36.7 °C) 98 °F (36.7 °C) 97.5 °F (36.4 °C) 97 °F (36.1 °C)   TempSrc: Oral Oral Oral Oral   SpO2: 97% 96% 97% 100%   Weight:       Height:           BP  Min: 109/65  Max: 136/65  Temp  Av.9 °F (36.6 °C)  Min: 97 °F (36.1 °C)  Max: 98.6 °F (37 °C)  Pulse  Av.7  Min: 60  Max: 69  Resp  Av.2  Min: 16  Max: 18  SpO2  Av.8 %  Min: 95 %  Max: 100 %  Height  Av' 10.3" (178.6 cm)  Min: 5' 10" (177.8 cm)  Max: 5' 11" (180.3 cm)  Weight  Av.2 kg (181 lb 5.2 oz)  Min: 76.2 kg (167 lb 15.9 oz)  Max: 94.3 kg (208 lb)    Body mass index is 24.1 kg/m².          General:             Well developed, well nourished, no apparent distress  HEENT:              NCAT, no JVD, mucous membranes moist, EOM intact  Cardiovascular:  Regular rate and rhythm, normal S1, normal S2, No murmurs, rubs, or gallops  Respiratory:        Normal breath sounds, no wheezes, no rales, no rhonchi  Abdomen:           Bowel sounds present, non tender, non distended, no masses, no hepatojugular reflux  Extremities:        No clubbing, no cyanosis, no edema  Vascular:            2+ b/l radial.  Peripheral pulses intact.  No carotid bruits.  Neurological:      No focal deficits  Skin:                   No obvious rashes or erythema, BL buttock shearing wounds, left heel unstagable pressure ulcer and right heel DTI all POA    Lab Results   Component Value Date    WBC 8.46 2023    HGB 7.0 (L) 2023    HCT 21.9 (L) 2023    MCV 98 2023     2023     No results found for: CHOL  No results found for: HDL  No " results found for: LDLCALC  No results found for: TRIG  No results found for: CHOLHDL  CMP  Recent Labs   Lab 05/05/23  0416      CALCIUM 8.3*   ALBUMIN 2.5*   PROT 5.8*      K 3.5   CO2 23      BUN 23   CREATININE 1.4   ALKPHOS 85   ALT 10   AST 19   BILITOT 0.7      No results found for: TSH      Assessment and Recommendations       Diagnoses:    1. Left heel unstagable pressure ulcer  2. Right heel DTI  3. Left buttock open wound from shearing  4. Right buttock open wound from shearing    Plan:  1. Triad to both buttock wounds  2. Right heel foam dressing  3. Left heel xeroform + a foam dressing  4. FU at the wound center on [place], pt stated his son could drive him to the center    Complexity:    medium

## 2023-05-05 NOTE — PLAN OF CARE
Problem: Physical Therapy  Goal: Physical Therapy Goal  Description: Goals to be met by: 2023     Patient will increase functional independence with mobility by performin. Supine to sit with MInimal Assistance  2. Sit to stand transfer with Minimal Assistance  3. Bed to chair transfer with Minimal Assistance using Rolling Walker  4. Gait  x 100 feet with Minimal Assistance using Rolling Walker.   5. Lower extremity exercise program x20 reps   Outcome: Ongoing, Progressing   Therapeutic activity : bed mobility , transfers, gait with rw and assistance for safety, LE exercises.    [place] Pregnancy And Lactation Text: This medication is Pregnancy Category X and is absolutely contraindicated during pregnancy. It is unknown if it is excreted in breast milk.

## 2023-05-05 NOTE — ASSESSMENT & PLAN NOTE
Creatine stable for now. BMP reviewed- noted Estimated Creatinine Clearance: 35.5 mL/min (based on SCr of 1.4 mg/dL). according to latest data. Monitor UOP and serial BMP and adjust therapy as needed. Renally dose meds.

## 2023-05-05 NOTE — ASSESSMENT & PLAN NOTE
Chronic, controlled.  Latest blood pressure and vitals reviewed-   Temp:  [97 °F (36.1 °C)-98.6 °F (37 °C)]   Pulse:  [60-65]   Resp:  [16-18]   BP: (109-136)/(54-65)   SpO2:  [95 %-100 %] .   Home meds for hypertension were reviewed and noted below.   Hypertension Medications             carvediloL (COREG) 6.25 MG tablet Take 6.25 mg by mouth 2 (two) times daily with meals.    furosemide (LASIX) 40 MG tablet Take 40 mg by mouth 2 (two) times daily.          While in the hospital, will manage blood pressure as follows; Continue home antihypertensive regimen    Will utilize p.r.n. blood pressure medication only if patient's blood pressure greater than  180/110 and he develops symptoms such as worsening chest pain or shortness of breath.

## 2023-05-05 NOTE — ASSESSMENT & PLAN NOTE
Nutrition consulted. Most recent weight and BMI monitored-     Measurements:  Wt Readings from Last 1 Encounters:   05/04/23 76.2 kg (167 lb 15.9 oz)   Body mass index is 24.1 kg/m².    Patient has been screened and assessed by RD.    Malnutrition Type:  Context: chronic illness  Level: moderate    Malnutrition Characteristic Summary:  Subcutaneous Fat (Malnutrition): mild depletion  Muscle Mass (Malnutrition): mild depletion  Fluid Accumulation (Malnutrition): moderate (3+)    Interventions/Recommendations (treatment strategy):  1) Continue Cardiac diet 2) Send Boost plus 1 x daily 3) weigh weekly

## 2023-05-05 NOTE — PLAN OF CARE
Several messages left for admission at [facility]... CM has not received any call back.        05/05/23 1522   Post-Acute Status   Post-Acute Authorization Placement

## 2023-05-05 NOTE — ASSESSMENT & PLAN NOTE
Patient's anemia is currently uncontrolled.  Has not received any PRBCs to date.. Etiology likely d/t chronic disease  Current CBC reviewed-   Lab Results   Component Value Date    HGB 7.0 (L) 05/05/2023    HCT 21.9 (L) 05/05/2023     Monitor serial CBC and transfuse if patient becomes hemodynamically unstable, symptomatic or H/H drops below 7/21.   5/5 Transfuse one unit PRBCs

## 2023-05-05 NOTE — PT/OT/SLP PROGRESS
"Physical Therapy Treatment    Patient Name:  Babak Nunez   MRN:  [id number]    Recommendations:     Discharge Recommendations: nursing facility, skilled  Discharge Equipment Recommendations: none  Barriers to discharge: None    Assessment:     Babak Nunez is a 89 y.o. male admitted with a medical diagnosis of Frequent falls.  He presents with the following impairments/functional limitations: weakness, impaired endurance, impaired self care skills, impaired functional mobility, gait instability, decreased lower extremity function . 2 attempts earlier, with nurse 1st , refused requesting to get some sleep 2nd attempt ( stated, " Your timing is perfect I was just about to doze off".  Seated in chair at this time. Reports feeling tired and not wanting to move much. Agreed to perform LE exercises.    Rehab Prognosis: Fair; patient would benefit from acute skilled PT services to address these deficits and reach maximum level of function.    Recent Surgery: * No surgery found *      Plan:     During this hospitalization, patient to be seen 6 x/week to address the identified rehab impairments via gait training, therapeutic activities, therapeutic exercises and progress toward the following goals:    Plan of Care Expires:  05/30/23    Subjective     Chief Complaint: feels tired/ lack of sleep  Patient/Family Comments/goals: none stated  Pain/Comfort:  Pain Rating 1: 0/10      Objective:     Communicated with nurse Mckinney prior to session.  Patient found up in chair with chair check, peripheral IV, telemetry upon PT entry to room.     General Precautions: Standard, fall  Orthopedic Precautions: N/A  Braces: N/A  Respiratory Status: Room air     Functional Mobility:  declined      AM-PAC 6 CLICK MOBILITY          Treatment & Education:  BLE exercises: TKE's, Hip abd/add/ flexion, SLR's, HS, QS, AP's.     Patient left up in chair with all lines intact, call button in reach, chair alarm on, and nurse Hank " notified..    GOALS:   Multidisciplinary Problems       Physical Therapy Goals          Problem: Physical Therapy    Goal Priority Disciplines Outcome Goal Variances Interventions   Physical Therapy Goal     PT, PT/OT Ongoing, Progressing     Description: Goals to be met by: 2023     Patient will increase functional independence with mobility by performin. Supine to sit with MInimal Assistance  2. Sit to stand transfer with Minimal Assistance  3. Bed to chair transfer with Minimal Assistance using Rolling Walker  4. Gait  x 100 feet with Minimal Assistance using Rolling Walker.   5. Lower extremity exercise program x20 reps                        Time Tracking:     PT Received On: 23  PT Start Time: 1110     PT Stop Time: 1120  PT Total Time (min): 10 min     Billable Minutes: Therapeutic Exercise 10min    Treatment Type: Treatment  PT/PTA: PTA     Number of PTA visits since last PT visit: 2023

## 2023-05-05 NOTE — PLAN OF CARE
Problem: Occupational Therapy  Goal: Occupational Therapy Goal  Description: Goals to be met by: 5/26/2023     Patient will increase functional independence with ADLs by performing:    LE Dressing with Modified [place] and Assistive Devices as needed.  Grooming while standing at sink with Modified [place].  Toileting from toilet with Modified [place] for hygiene and clothing management.   Supine to sit with Modified [place].  Toilet transfer to toilet with Modified [place].    Outcome: Ongoing, Progressing

## 2023-05-05 NOTE — PLAN OF CARE
Per Astrid with [facility], SNF request will be sent to her medical director for review. Will call me back once she has determination.          05/05/23 1242   Post-Acute Status   Post-Acute Authorization Placement   Post-Acute Placement Status Pending payor review/awaiting authorization (if required)

## 2023-05-05 NOTE — CONSULTS
Consulted for wounds present on admit.   Right posterior heel DTI, Left heel unstagable and shearing to bilateral buttocks with areas opened to stage 2. Triad to left posterior heel and covered with a cut piece of xeroform and covered with a heel foam dressing. Right heel applied a heel foam dressing and applied Triad to buttocks and left open to air. Orders noted. Dr. Garcia consulted and will see this patient tomorrow on rounds. Wound care to follow.      Left posterior heel unstageable wound measures 1cm x 1.2cm x 1cm    Right posterior heel DTI deep purple wound area measures 1cm x 2.5cm.    Bilateral buttocks shearing areas opened to stage 2 wounds

## 2023-05-05 NOTE — PLAN OF CARE
CM called pt's daughter, Jesika, per request. Jesika wanted to discuss back up plan- states she was in shock yesterday and did not think of everything yesterday and wanted to talk to me again. States there is no back up plan because patient can not go home nor to her house or her sister's house because it is not handicap accessible and they cannot care for him. States she wants him to go to [facility]. I explained that I can send referral to [facility] but again if [facility] nor [facility] approves then will have to go another facility IF one accepts or to home with 24/7 sitters. Jesika again stated he cannot go anywhere but facility and she has to view any facility first. I again explain observation status and that patient cannot stay just for placement. I reassured her that i'm trying to get placement for him but also need realistic back up plan due to observation status and being medically clear. I asked to her also call these facilities especially [facility] since that's first choice to help me get him accepted. She then asked me to call her sister, I reminded her of conversation yesterday and making her a point person for her to update her sister of our conversations due to time limitations. I also offered for them to make family meeting that way I would be able to speak to both at one time.        05/05/23 0813   Post-Acute Status   Post-Acute Authorization Placement   Post-Acute Placement Status Referrals Sent

## 2023-05-05 NOTE — PLAN OF CARE
SNF referrals sent to several facilities. Also submitted to [facility] for auth.     Discussed SNF over the phone with pt's daughter, Jesika, per pt request pt needs placement and prefers [facility]. I explained SNF process and that we will try to place but if pt becomes medically clear cannot stay just for placement. I also explained that a long term medicaid dayna needs to be completed - states patient won't qualify and they are willing to [facility] pocket [facility] if needed. Also willing to pay copay days if needed. I also discussed family coming up with backup just in case placement cannot be done from hospital - we talked about sitters and equip. Lizbeth verbalized understanding. I informed Jesika that I received a call from her sister as well and asked her to update her of our conversation.        05/04/23 1200   Post-Acute Status   Post-Acute Authorization Placement   Post-Acute Placement Status Referrals Sent

## 2023-05-05 NOTE — PLAN OF CARE
Problem: Adult Inpatient Plan of Care  Goal: Plan of Care Review  Outcome: Ongoing, Progressing  Goal: Patient-Specific Goal (Individualized)  Outcome: Ongoing, Progressing  Goal: Absence of Hospital-Acquired Illness or Injury  Outcome: Ongoing, Progressing  Goal: Optimal Comfort and Wellbeing  Outcome: Ongoing, Progressing  Problem: Adult Inpatient Plan of Care  Goal: Plan of Care Review  Outcome: Ongoing, Progressing  Goal: Patient-Specific Goal (Individualized)  Outcome: Ongoing, Progressing  Goal: Absence of Hospital-Acquired Illness or Injury  Outcome: Ongoing, Progressing  Goal: Optimal Comfort and Wellbeing  Outcome: Ongoing, Progressing  Goal: Readiness for Transition of Care  Outcome: Ongoing, Progressing     Problem: Fall Injury Risk  Goal: Absence of Fall and Fall-Related Injury  Outcome: Ongoing, Progressing   Plan of care reviewed with patient & daughters verbalized understanding.  IV fluids administered as per orders. Remains free from falls, injury. Instructed to call for assistance as needed ,verbalized understanding. Bed in low & locked position. Call light in reach, bed alarm on .       Goal: Readiness for Transition of Care  Outcome: Ongoing, Progressing     Problem: Fall Injury Risk  Goal: Absence of Fall and Fall-Related Injury  Outcome: Ongoing, Progressing   Plan of care reviewed with patient & daughters verbalized understanding.  IV fluids administered as per orders. Remains free from falls, injury. Instructed to call for assistance as needed ,verbalized understanding. Bed in low & locked position. Call light in reach, bed alarm on .

## 2023-05-06 LAB
ALBUMIN SERPL BCP-MCNC: 2.7 G/DL (ref 3.5–5.2)
ALP SERPL-CCNC: 100 U/L (ref 55–135)
ALT SERPL W/O P-5'-P-CCNC: 12 U/L (ref 10–44)
ANION GAP SERPL CALC-SCNC: 7 MMOL/L (ref 8–16)
AST SERPL-CCNC: 23 U/L (ref 10–40)
BASOPHILS # BLD AUTO: 0.07 K/UL (ref 0–0.2)
BASOPHILS NFR BLD: 0.8 % (ref 0–1.9)
BILIRUB SERPL-MCNC: 1 MG/DL (ref 0.1–1)
BUN SERPL-MCNC: 22 MG/DL (ref 10–30)
CALCIUM SERPL-MCNC: 8.5 MG/DL (ref 8.7–10.5)
CHLORIDE SERPL-SCNC: 108 MMOL/L (ref 95–110)
CO2 SERPL-SCNC: 25 MMOL/L (ref 23–29)
CREAT SERPL-MCNC: 1.3 MG/DL (ref 0.5–1.4)
DIFFERENTIAL METHOD: ABNORMAL
EOSINOPHIL # BLD AUTO: 0.5 K/UL (ref 0–0.5)
EOSINOPHIL NFR BLD: 6.6 % (ref 0–8)
ERYTHROCYTE [DISTWIDTH] IN BLOOD BY AUTOMATED COUNT: 15.9 % (ref 11.5–14.5)
EST. GFR  (NO RACE VARIABLE): 52 ML/MIN/1.73 M^2
GLUCOSE SERPL-MCNC: 99 MG/DL (ref 70–110)
HCT VFR BLD AUTO: 25.2 % (ref 40–54)
HGB BLD-MCNC: 8 G/DL (ref 14–18)
IMM GRANULOCYTES # BLD AUTO: 0.02 K/UL (ref 0–0.04)
IMM GRANULOCYTES NFR BLD AUTO: 0.2 % (ref 0–0.5)
LYMPHOCYTES # BLD AUTO: 1.9 K/UL (ref 1–4.8)
LYMPHOCYTES NFR BLD: 23.3 % (ref 18–48)
MCH RBC QN AUTO: 31 PG (ref 27–31)
MCHC RBC AUTO-ENTMCNC: 31.7 G/DL (ref 32–36)
MCV RBC AUTO: 98 FL (ref 82–98)
MONOCYTES # BLD AUTO: 1 K/UL (ref 0.3–1)
MONOCYTES NFR BLD: 12.1 % (ref 4–15)
NEUTROPHILS # BLD AUTO: 4.7 K/UL (ref 1.8–7.7)
NEUTROPHILS NFR BLD: 57 % (ref 38–73)
NRBC BLD-RTO: 0 /100 WBC
PLATELET # BLD AUTO: 254 K/UL (ref 150–450)
PMV BLD AUTO: 9.4 FL (ref 9.2–12.9)
POTASSIUM SERPL-SCNC: 4 MMOL/L (ref 3.5–5.1)
PROT SERPL-MCNC: 6.1 G/DL (ref 6–8.4)
RBC # BLD AUTO: 2.58 M/UL (ref 4.6–6.2)
SODIUM SERPL-SCNC: 140 MMOL/L (ref 136–145)
TB INDURATION 48 - 72 HR READ: 0 MM
WBC # BLD AUTO: 8.24 K/UL (ref 3.9–12.7)

## 2023-05-06 PROCEDURE — 80053 COMPREHEN METABOLIC PANEL: CPT | Performed by: NURSE PRACTITIONER

## 2023-05-06 PROCEDURE — 85025 COMPLETE CBC W/AUTO DIFF WBC: CPT | Performed by: NURSE PRACTITIONER

## 2023-05-06 PROCEDURE — 36415 COLL VENOUS BLD VENIPUNCTURE: CPT | Performed by: NURSE PRACTITIONER

## 2023-05-06 PROCEDURE — 25000003 PHARM REV CODE 250: Performed by: NURSE PRACTITIONER

## 2023-05-06 PROCEDURE — G0378 HOSPITAL OBSERVATION PER HR: HCPCS

## 2023-05-06 PROCEDURE — 96361 HYDRATE IV INFUSION ADD-ON: CPT

## 2023-05-06 PROCEDURE — 97110 THERAPEUTIC EXERCISES: CPT

## 2023-05-06 PROCEDURE — 25000003 PHARM REV CODE 250: Performed by: INTERNAL MEDICINE

## 2023-05-06 RX ADMIN — APIXABAN 5 MG: 2.5 TABLET, FILM COATED ORAL at 08:05

## 2023-05-06 RX ADMIN — ATORVASTATIN CALCIUM 40 MG: 40 TABLET, FILM COATED ORAL at 08:05

## 2023-05-06 RX ADMIN — MIRTAZAPINE 15 MG: 15 TABLET, FILM COATED ORAL at 08:05

## 2023-05-06 RX ADMIN — CARVEDILOL 3.12 MG: 3.12 TABLET, FILM COATED ORAL at 08:05

## 2023-05-06 RX ADMIN — CYANOCOBALAMIN TAB 1000 MCG 1000 MCG: 1000 TAB at 08:05

## 2023-05-06 RX ADMIN — MEMANTINE 5 MG: 5 TABLET ORAL at 08:05

## 2023-05-06 RX ADMIN — QUETIAPINE 50 MG: 25 TABLET ORAL at 08:05

## 2023-05-06 RX ADMIN — TAMSULOSIN HYDROCHLORIDE 0.4 MG: 0.4 CAPSULE ORAL at 08:05

## 2023-05-06 RX ADMIN — CARVEDILOL 3.12 MG: 3.12 TABLET, FILM COATED ORAL at 04:05

## 2023-05-06 RX ADMIN — ASPIRIN 81 MG: 81 TABLET, COATED ORAL at 08:05

## 2023-05-06 RX ADMIN — MELATONIN TAB 3 MG 9 MG: 3 TAB at 08:05

## 2023-05-06 NOTE — ASSESSMENT & PLAN NOTE
Creatine stable for now. BMP reviewed- noted Estimated Creatinine Clearance: 38.2 mL/min (based on SCr of 1.3 mg/dL). according to latest data. Monitor UOP and serial BMP and adjust therapy as needed. Renally dose meds.

## 2023-05-06 NOTE — PROGRESS NOTES
[facility] Hospital Medicine  Progress Note    Patient Name: Babak Nunez  MRN: [id number]  Patient Class: OP- Observation   Admission Date: 5/4/2023  Length of Stay: 0 days  Attending Physician: Christal Radford MD  Primary Care Provider: [facility] ADMINISTRATION        Subjective:     Principal Problem:Frequent falls        HPI:  Babak Nunez is a 89 year old male with a past medical history of Afib, HTN, HLD, dementia, BPH, DVT and pacemaker placement, who presented to the ED with a complaint of left hip pain after sustaining a fall at home. HPI per family and patient. His family reports that he has been having difficulty with his left hip, which has been coming out of socket more frequently. He was due to have surgery on it today but due to recent events was cancelled. According to his daughters, after a recent admission at [facility], he was sent to [facility] in [place] after hip surgery. They were told that in order to discharge him back home, they would be setting up a hospital bed, walker and wheelchair, as well as other home services to provide for a safe discharge. He began to develop some behavioral issues and was transferred to [facility], where he was diagnosed with early dementia. [facility] discharged the patient back to [facility], but [facility] would not accept for unclear reasons, causing the patient to return home with no DME or home health services. He states he does not want to live in a nursing home, and wants to be at his own house. His wife is also elderly with multiple medical issues, and is unable to care for him on her own. The patient currently denies complaint except for some soreness to the left hip. He denies other complaint    ED work up included a CBC, which showed chronic anemia. CMP showed CKD and chronic moderate malnutrition. Xray of the left hip showed no acute fracture or dislocation. Due increase in falls with multiple risk factors for complication, decision made to  admit to observation for PT/OT evaluation and case management consultation. Hospital Medicine consulted for admission and further management.         Overview/Hospital Course:  No notes on file    Interval History: Patient seen and examined.  Hemoglobin 8 today after getting a unit of PRBCs yesterday.  Reports feeling  less fatigued today.  CM working on SNF placement.    Review of Systems   Constitutional:  Negative for chills and fever.   HENT:  Negative for congestion and sore throat.    Eyes:  Negative for visual disturbance.   Respiratory:  Negative for cough and shortness of breath.    Cardiovascular:  Negative for chest pain and palpitations.   Gastrointestinal:  Negative for abdominal pain, constipation, diarrhea, nausea and vomiting.   Endocrine: Negative for cold intolerance and heat intolerance.   Genitourinary:  Negative for dysuria and hematuria.   Musculoskeletal:  Positive for arthralgias and gait problem (frequent falls). Negative for myalgias.   Skin:  Negative for rash.   Neurological:  Negative for dizziness, tremors and seizures.   Hematological:  Negative for adenopathy. Does not bruise/bleed easily.   All other systems reviewed and are negative.  Objective:     Vital Signs (Most Recent):  Temp: 98.1 °F (36.7 °C) (05/06/23 1041)  Pulse: 63 (05/06/23 1041)  Resp: 16 (05/06/23 1041)  BP: (!) 127/58 (05/06/23 1041)  SpO2: 97 % (05/06/23 1041) Vital Signs (24h Range):  Temp:  [97 °F (36.1 °C)-98.2 °F (36.8 °C)] 98.1 °F (36.7 °C)  Pulse:  [60-66] 63  Resp:  [16-20] 16  SpO2:  [95 %-100 %] 97 %  BP: (123-167)/(57-74) 127/58     Weight: 76.2 kg (167 lb 15.9 oz)  Body mass index is 24.1 kg/m².    Intake/Output Summary (Last 24 hours) at 5/6/2023 1109  Last data filed at 5/6/2023 1008  Gross per 24 hour   Intake 1668.33 ml   Output 1615 ml   Net 53.33 ml           Physical Exam  Vitals and nursing note reviewed.   Constitutional:       General: He is awake. He is not in acute distress.     Appearance:  Normal appearance. He is well-developed and overweight. He is ill-appearing (elderly).   HENT:      Head: Normocephalic and atraumatic.      Nose: Nose normal. No septal deviation.      Mouth/Throat:      Lips: Pink.      Mouth: Mucous membranes are moist.   Eyes:      Conjunctiva/sclera: Conjunctivae normal.      Pupils: Pupils are equal, round, and reactive to light.   Neck:      Thyroid: No thyroid mass.      Vascular: No JVD.      Trachea: No tracheal tenderness or tracheal deviation.   Cardiovascular:      Rate and Rhythm: Normal rate and regular rhythm.      Heart sounds: S1 normal and S2 normal. No murmur heard.    No friction rub. No gallop.   Pulmonary:      Effort: Pulmonary effort is normal.      Breath sounds: Normal breath sounds. No decreased breath sounds, wheezing, rhonchi or rales.   Abdominal:      General: Bowel sounds are normal. There is no distension.      Palpations: Abdomen is soft. There is no hepatomegaly, splenomegaly or mass.      Tenderness: There is no abdominal tenderness.   Musculoskeletal:      Cervical back: Full passive range of motion without pain, normal range of motion and neck supple.      Left hip: Decreased range of motion.      Left lower leg: Edema present.   Skin:     General: Skin is warm.      Coloration: Skin is pale.      Findings: Wound present. No rash.          Neurological:      General: No focal deficit present.      Mental Status: He is alert and oriented to person, place, and time.      Cranial Nerves: No cranial nerve deficit.      Sensory: No sensory deficit.   Psychiatric:         Mood and Affect: Mood normal.         Behavior: Behavior normal. Behavior is cooperative.           Significant Labs: All pertinent labs within the past 24 hours have been reviewed.    Significant Imaging: I have reviewed all pertinent imaging results/findings within the past 24 hours.      Assessment/Plan:      * Frequent falls  CM working on SNF  Fall precautions  Assist with  ADLs  PT/OT consult      Anemia    Patient's anemia is currently uncontrolled. . Etiology likely d/t chronic disease  Current CBC reviewed-   Lab Results   Component Value Date    HGB 8.0 (L) 05/06/2023    HCT 25.2 (L) 05/06/2023     Monitor serial CBC and transfuse if patient becomes hemodynamically unstable, symptomatic or H/H drops below 7/21.   5/5 Transfused one unit PRBCs      Malnutrition of moderate degree  Nutrition consulted. Most recent weight and BMI monitored-     Measurements:  Wt Readings from Last 1 Encounters:   05/04/23 76.2 kg (167 lb 15.9 oz)   Body mass index is 24.1 kg/m².    Patient has been screened and assessed by RD.    Malnutrition Type:  Context: chronic illness  Level: moderate    Malnutrition Characteristic Summary:  Subcutaneous Fat (Malnutrition): mild depletion  Muscle Mass (Malnutrition): mild depletion  Fluid Accumulation (Malnutrition): moderate (3+)    Interventions/Recommendations (treatment strategy):  1) Continue Cardiac diet 2) Send Boost plus 1 x daily 3) weigh weekly    Ulcer of left heel    Noted, chronic  Healing, improved per family  Wound care      Dementia with behavioral disturbance    Noted, chronic  Delirium precautions  Fall precautions  Continue namenda and seroquel    Stage 3 chronic kidney disease    Creatine stable for now. BMP reviewed- noted Estimated Creatinine Clearance: 38.2 mL/min (based on SCr of 1.3 mg/dL). according to latest data. Monitor UOP and serial BMP and adjust therapy as needed. Renally dose meds.          S/P placement of cardiac pacemaker    Noted, on tele    Paroxysmal atrial fibrillation  Admit to tele  NSR on cm  Anticoagulated with Eliquis.        Hyperlipidemia     Patient is chronically on statin.will continue for now. Monitor clinically. Last LDL was No results found for: LDLCALC       Benign prostatic hyperplasia    Noted, chronic  Continue flomax    Benign essential hypertension    Chronic, controlled.  Latest blood pressure and  vitals reviewed-   Temp:  [97 °F (36.1 °C)-98.2 °F (36.8 °C)]   Pulse:  [60-66]   Resp:  [16-20]   BP: (123-167)/(57-74)   SpO2:  [95 %-100 %] .   Home meds for hypertension were reviewed and noted below.   Hypertension Medications             carvediloL (COREG) 6.25 MG tablet Take 6.25 mg by mouth 2 (two) times daily with meals.    furosemide (LASIX) 40 MG tablet Take 40 mg by mouth 2 (two) times daily.          While in the hospital, will manage blood pressure as follows; Continue home antihypertensive regimen    Will utilize p.r.n. blood pressure medication only if patient's blood pressure greater than  180/110 and he develops symptoms such as worsening chest pain or shortness of breath.        VTE Risk Mitigation (From admission, onward)         Ordered     apixaban tablet 5 mg  2 times daily         05/04/23 0519     IP VTE HIGH RISK PATIENT  Once         05/04/23 0519     Place sequential compression device  Until discontinued         05/04/23 0519     Reason for No Pharmacological VTE Prophylaxis  Once        Question:  Reasons:  Answer:  Already adequately anticoagulated on oral Anticoagulants    05/04/23 0519                Discharge Planning   LILIANA: 5/8/2023     Code Status: DNR   Is the patient medically ready for discharge?:     Reason for patient still in hospital (select all that apply): Other (specify) Pending placement  Discharge Plan A: Skilled Nursing Facility   Discharge Delays: (!) Patient and Family Barriers              Christal Radford MD  Department of Hospital Medicine   [facility]

## 2023-05-06 NOTE — PLAN OF CARE
Problem: Adult Inpatient Plan of Care  Goal: Plan of Care Review  Outcome: Ongoing, Progressing  Goal: Patient-Specific Goal (Individualized)  Outcome: Ongoing, Progressing  Goal: Absence of Hospital-Acquired Illness or Injury  Outcome: Ongoing, Progressing  Goal: Optimal Comfort and Wellbeing  Outcome: Ongoing, Progressing  Goal: Readiness for Transition of Care  Outcome: Ongoing, Progressing     Problem: Fall Injury Risk  Goal: Absence of Fall and Fall-Related Injury  Outcome: Ongoing, Progressing     Problem: Skin Injury Risk Increased  Goal: Skin Health and Integrity  Outcome: Ongoing, Progressing   Plan of care reviewed with patient,verbalized understanding. No complaints during night. IV fluids administered as per orders. Remains free from falls, injury. Instructed to call for assistance as needed ,verbalized understanding. Bed in low & locked position. Call light in reach, bed alarm on .

## 2023-05-06 NOTE — ASSESSMENT & PLAN NOTE
Chronic, controlled.  Latest blood pressure and vitals reviewed-   Temp:  [97 °F (36.1 °C)-98.2 °F (36.8 °C)]   Pulse:  [60-66]   Resp:  [16-20]   BP: (123-167)/(57-74)   SpO2:  [95 %-100 %] .   Home meds for hypertension were reviewed and noted below.   Hypertension Medications             carvediloL (COREG) 6.25 MG tablet Take 6.25 mg by mouth 2 (two) times daily with meals.    furosemide (LASIX) 40 MG tablet Take 40 mg by mouth 2 (two) times daily.          While in the hospital, will manage blood pressure as follows; Continue home antihypertensive regimen    Will utilize p.r.n. blood pressure medication only if patient's blood pressure greater than  180/110 and he develops symptoms such as worsening chest pain or shortness of breath.

## 2023-05-06 NOTE — SUBJECTIVE & OBJECTIVE
Interval History: Patient seen and examined.  Hemoglobin 8 today after getting a unit of PRBCs yesterday.  Reports feeling  less fatigued today.  CM working on SNF placement.    Review of Systems   Constitutional:  Negative for chills and fever.   HENT:  Negative for congestion and sore throat.    Eyes:  Negative for visual disturbance.   Respiratory:  Negative for cough and shortness of breath.    Cardiovascular:  Negative for chest pain and palpitations.   Gastrointestinal:  Negative for abdominal pain, constipation, diarrhea, nausea and vomiting.   Endocrine: Negative for cold intolerance and heat intolerance.   Genitourinary:  Negative for dysuria and hematuria.   Musculoskeletal:  Positive for arthralgias and gait problem (frequent falls). Negative for myalgias.   Skin:  Negative for rash.   Neurological:  Negative for dizziness, tremors and seizures.   Hematological:  Negative for adenopathy. Does not bruise/bleed easily.   All other systems reviewed and are negative.  Objective:     Vital Signs (Most Recent):  Temp: 98.1 °F (36.7 °C) (05/06/23 1041)  Pulse: 63 (05/06/23 1041)  Resp: 16 (05/06/23 1041)  BP: (!) 127/58 (05/06/23 1041)  SpO2: 97 % (05/06/23 1041) Vital Signs (24h Range):  Temp:  [97 °F (36.1 °C)-98.2 °F (36.8 °C)] 98.1 °F (36.7 °C)  Pulse:  [60-66] 63  Resp:  [16-20] 16  SpO2:  [95 %-100 %] 97 %  BP: (123-167)/(57-74) 127/58     Weight: 76.2 kg (167 lb 15.9 oz)  Body mass index is 24.1 kg/m².    Intake/Output Summary (Last 24 hours) at 5/6/2023 1109  Last data filed at 5/6/2023 1008  Gross per 24 hour   Intake 1668.33 ml   Output 1615 ml   Net 53.33 ml           Physical Exam  Vitals and nursing note reviewed.   Constitutional:       General: He is awake. He is not in acute distress.     Appearance: Normal appearance. He is well-developed and overweight. He is ill-appearing (elderly).   HENT:      Head: Normocephalic and atraumatic.      Nose: Nose normal. No septal deviation.      Mouth/Throat:       Lips: Pink.      Mouth: Mucous membranes are moist.   Eyes:      Conjunctiva/sclera: Conjunctivae normal.      Pupils: Pupils are equal, round, and reactive to light.   Neck:      Thyroid: No thyroid mass.      Vascular: No JVD.      Trachea: No tracheal tenderness or tracheal deviation.   Cardiovascular:      Rate and Rhythm: Normal rate and regular rhythm.      Heart sounds: S1 normal and S2 normal. No murmur heard.    No friction rub. No gallop.   Pulmonary:      Effort: Pulmonary effort is normal.      Breath sounds: Normal breath sounds. No decreased breath sounds, wheezing, rhonchi or rales.   Abdominal:      General: Bowel sounds are normal. There is no distension.      Palpations: Abdomen is soft. There is no hepatomegaly, splenomegaly or mass.      Tenderness: There is no abdominal tenderness.   Musculoskeletal:      Cervical back: Full passive range of motion without pain, normal range of motion and neck supple.      Left hip: Decreased range of motion.      Left lower leg: Edema present.   Skin:     General: Skin is warm.      Coloration: Skin is pale.      Findings: Wound present. No rash.          Neurological:      General: No focal deficit present.      Mental Status: He is alert and oriented to person, place, and time.      Cranial Nerves: No cranial nerve deficit.      Sensory: No sensory deficit.   Psychiatric:         Mood and Affect: Mood normal.         Behavior: Behavior normal. Behavior is cooperative.           Significant Labs: All pertinent labs within the past 24 hours have been reviewed.    Significant Imaging: I have reviewed all pertinent imaging results/findings within the past 24 hours.

## 2023-05-06 NOTE — ASSESSMENT & PLAN NOTE
Patient's anemia is currently uncontrolled. . Etiology likely d/t chronic disease  Current CBC reviewed-   Lab Results   Component Value Date    HGB 8.0 (L) 05/06/2023    HCT 25.2 (L) 05/06/2023     Monitor serial CBC and transfuse if patient becomes hemodynamically unstable, symptomatic or H/H drops below 7/21.   5/5 Transfused one unit PRBCs

## 2023-05-06 NOTE — PT/OT/SLP PROGRESS
Occupational Therapy      Patient Name:  Babak Nunez   MRN:  [id number]    Patient not seen today secondary to patient was unwilling to participate at 1315 and 1510. Will follow-up.    5/6/2023

## 2023-05-06 NOTE — PLAN OF CARE
Problem: Physical Therapy  Goal: Physical Therapy Goal  Description: Goals to be met by: 2023     Patient will increase functional independence with mobility by performin. Supine to sit with MInimal Assistance  2. Sit to stand transfer with Minimal Assistance  3. Bed to chair transfer with Minimal Assistance using Rolling Walker  4. Gait  x 100 feet with Minimal Assistance using Rolling Walker.   5. Lower extremity exercise program x20 reps   Outcome: Ongoing, Progressing

## 2023-05-06 NOTE — PT/OT/SLP PROGRESS
Physical Therapy Treatment    Patient Name:  Babak Nunez   MRN:  [id number]    Recommendations:     Discharge Recommendations: nursing facility, skilled  Discharge Equipment Recommendations: none  Barriers to discharge: None    Assessment:     Babak Nunez is a 89 y.o. male admitted with a medical diagnosis of Frequent falls.  He presents with the following impairments/functional limitations: weakness, impaired endurance, impaired self care skills, impaired functional mobility, gait instability, decreased lower extremity function, impaired balance Pt agreeable to participate in PT treatment. Pt performs supine to sit with MOD I using hospital rail to assist. He performs sit to stand with MIN A and ambulates x30' using RW with CGA and chair follow.    Rehab Prognosis: Good; patient would benefit from acute skilled PT services to address these deficits and reach maximum level of function.    Recent Surgery: * No surgery found *      Plan:     During this hospitalization, patient to be seen 6 x/week to address the identified rehab impairments via gait training, therapeutic activities, therapeutic exercises and progress toward the following goals:    Plan of Care Expires:  05/30/23    Subjective     Chief Complaint: Left hip gives him trouble  Patient/Family Comments/goals: Maximize functional strength and mobility prior to discharge   Pain/Comfort:  Pain Rating 1: 1/10  Location - Side 1: Left  Location 1: hip  Pain Addressed 1: Reposition  Pain Rating Post-Intervention 1: 0/10      Objective:     Communicated with nurse [place] prior to session.  Patient found HOB elevated with peripheral IV, telemetry upon PT entry to room.     General Precautions: Standard, fall  Orthopedic Precautions: N/A  Braces: N/A  Respiratory Status: Room air     Functional Mobility:  Bed Mobility:     Rolling Left:  modified independence  Supine to Sit: modified independence  Transfers:     Sit to Stand:  minimum assistance with rolling  walker  Gait: 30ft using RW with CGA       AM-PAC 6 CLICK MOBILITY          Treatment & Education:    Seated Lower extremity therex :     Long arc quad  x20 eachs milly   Marching x20 each side     Transfers and gait as above     Patient left up in chair with all lines intact, call button in reach, and chair alarm on..    GOALS:   Multidisciplinary Problems       Physical Therapy Goals          Problem: Physical Therapy    Goal Priority Disciplines Outcome Goal Variances Interventions   Physical Therapy Goal     PT, PT/OT Ongoing, Progressing     Description: Goals to be met by: 2023     Patient will increase functional independence with mobility by performin. Supine to sit with MInimal Assistance  2. Sit to stand transfer with Minimal Assistance  3. Bed to chair transfer with Minimal Assistance using Rolling Walker  4. Gait  x 100 feet with Minimal Assistance using Rolling Walker.   5. Lower extremity exercise program x20 reps                        Time Tracking:     PT Received On: 23  PT Start Time: 924     PT Stop Time: 942  PT Total Time (min): 18 min     Billable Minutes: Therapeutic Exercise 18    Treatment Type: Treatment  PT/PTA: PT     Number of PTA visits since last PT visit: 1     2023

## 2023-05-07 PROCEDURE — 25000003 PHARM REV CODE 250: Performed by: NURSE PRACTITIONER

## 2023-05-07 PROCEDURE — 25000003 PHARM REV CODE 250: Performed by: INTERNAL MEDICINE

## 2023-05-07 PROCEDURE — 96361 HYDRATE IV INFUSION ADD-ON: CPT

## 2023-05-07 PROCEDURE — G0378 HOSPITAL OBSERVATION PER HR: HCPCS

## 2023-05-07 PROCEDURE — 63600175 PHARM REV CODE 636 W HCPCS: Performed by: NURSE PRACTITIONER

## 2023-05-07 RX ADMIN — APIXABAN 5 MG: 2.5 TABLET, FILM COATED ORAL at 09:05

## 2023-05-07 RX ADMIN — ASPIRIN 81 MG: 81 TABLET, COATED ORAL at 08:05

## 2023-05-07 RX ADMIN — MEMANTINE 5 MG: 5 TABLET ORAL at 08:05

## 2023-05-07 RX ADMIN — MEMANTINE 5 MG: 5 TABLET ORAL at 09:05

## 2023-05-07 RX ADMIN — SODIUM CHLORIDE, POTASSIUM CHLORIDE, SODIUM LACTATE AND CALCIUM CHLORIDE: 600; 310; 30; 20 INJECTION, SOLUTION INTRAVENOUS at 08:05

## 2023-05-07 RX ADMIN — SODIUM CHLORIDE, POTASSIUM CHLORIDE, SODIUM LACTATE AND CALCIUM CHLORIDE: 600; 310; 30; 20 INJECTION, SOLUTION INTRAVENOUS at 09:05

## 2023-05-07 RX ADMIN — ATORVASTATIN CALCIUM 40 MG: 40 TABLET, FILM COATED ORAL at 08:05

## 2023-05-07 RX ADMIN — MIRTAZAPINE 15 MG: 15 TABLET, FILM COATED ORAL at 09:05

## 2023-05-07 RX ADMIN — TAMSULOSIN HYDROCHLORIDE 0.4 MG: 0.4 CAPSULE ORAL at 08:05

## 2023-05-07 RX ADMIN — CARVEDILOL 3.12 MG: 3.12 TABLET, FILM COATED ORAL at 04:05

## 2023-05-07 RX ADMIN — QUETIAPINE 50 MG: 25 TABLET ORAL at 09:05

## 2023-05-07 RX ADMIN — CARVEDILOL 3.12 MG: 3.12 TABLET, FILM COATED ORAL at 08:05

## 2023-05-07 RX ADMIN — CYANOCOBALAMIN TAB 1000 MCG 1000 MCG: 1000 TAB at 08:05

## 2023-05-07 RX ADMIN — APIXABAN 5 MG: 2.5 TABLET, FILM COATED ORAL at 08:05

## 2023-05-07 RX ADMIN — POLYETHYLENE GLYCOL 3350 17 G: 17 POWDER, FOR SOLUTION ORAL at 08:05

## 2023-05-07 NOTE — ASSESSMENT & PLAN NOTE
Chronic, controlled.  Latest blood pressure and vitals reviewed-   Temp:  [98 °F (36.7 °C)-98.3 °F (36.8 °C)]   Pulse:  [62-68]   Resp:  [16]   BP: (123-147)/(57-65)   SpO2:  [95 %-96 %] .   Home meds for hypertension were reviewed and noted below.   Hypertension Medications             carvediloL (COREG) 6.25 MG tablet Take 6.25 mg by mouth 2 (two) times daily with meals.    furosemide (LASIX) 40 MG tablet Take 40 mg by mouth 2 (two) times daily.          While in the hospital, will manage blood pressure as follows; Continue home antihypertensive regimen    Will utilize p.r.n. blood pressure medication only if patient's blood pressure greater than  180/110 and he develops symptoms such as worsening chest pain or shortness of breath.

## 2023-05-07 NOTE — HOSPITAL COURSE
89-year-old  male with past medical history significant for hypertension, hyperlipidemia, dementia, chronic atrial fibrillation, history of DVT status post permanent pacemaker placement who recently underwent left hip surgery followed by Skilled Nursing Facility placement and subsequently went to Behavioral Health unit for dementia with behavioral disturbance.  Admitted with acute kidney injury and possibly moderate-severe anemia with hemoglobin 7.7 which appears to be mixed iron deficiency and B12 deficiency.  He was started on B12 supplementation.  Patient was transfused 1 unit of blood.  He worked with PT and OT who recommended skilled nursing facility.   worked on placement.

## 2023-05-07 NOTE — PROGRESS NOTES
[facility] Hospital Medicine  Progress Note    Patient Name: Babak Nunez  MRN: [id number]  Patient Class: OP- Observation   Admission Date: 5/4/2023  Length of Stay: 0 days  Attending Physician: Christal Radford MD  Primary Care Provider: [facility] ADMINISTRATION        Subjective:     Principal Problem:Frequent falls        HPI:  Babak Nunez is a 89 year old male with a past medical history of Afib, HTN, HLD, dementia, BPH, DVT and pacemaker placement, who presented to the ED with a complaint of left hip pain after sustaining a fall at home. HPI per family and patient. His family reports that he has been having difficulty with his left hip, which has been coming out of socket more frequently. He was due to have surgery on it today but due to recent events was cancelled. According to his daughters, after a recent admission at [facility], he was sent to [facility] in [place] after hip surgery. They were told that in order to discharge him back home, they would be setting up a hospital bed, walker and wheelchair, as well as other home services to provide for a safe discharge. He began to develop some behavioral issues and was transferred to [facility], where he was diagnosed with early dementia. [facility] discharged the patient back to [facility], but [facility] would not accept for unclear reasons, causing the patient to return home with no DME or home health services. He states he does not want to live in a nursing home, and wants to be at his own house. His wife is also elderly with multiple medical issues, and is unable to care for him on her own. The patient currently denies complaint except for some soreness to the left hip. He denies other complaint    ED work up included a CBC, which showed chronic anemia. CMP showed CKD and chronic moderate malnutrition. Xray of the left hip showed no acute fracture or dislocation. Due increase in falls with multiple risk factors for complication, decision made to  admit to observation for PT/OT evaluation and case management consultation. Hospital Medicine consulted for admission and further management.         Overview/Hospital Course:   89-year-old  male with past medical history significant for hypertension, hyperlipidemia, dementia, chronic atrial fibrillation, history of DVT status post permanent pacemaker placement who recently underwent left hip surgery followed by Skilled Nursing Facility placement and subsequently went to Behavioral Health unit for dementia with behavioral disturbance.  Admitted with acute kidney injury and possibly moderate-severe anemia with hemoglobin 7.7 which appears to be mixed iron deficiency and B12 deficiency.  He was started on B12 supplementation.  Patient was transfused 1 unit of blood.  He worked with PT and OT who recommended skilled nursing facility.   worked on placement.      Interval History: Patient seen and examined.  Denies any new complaints.  CM working on SNF placement.    Review of Systems   Constitutional:  Negative for chills and fever.   HENT:  Negative for congestion and sore throat.    Eyes:  Negative for visual disturbance.   Respiratory:  Negative for cough and shortness of breath.    Cardiovascular:  Negative for chest pain and palpitations.   Gastrointestinal:  Negative for abdominal pain, constipation, diarrhea, nausea and vomiting.   Endocrine: Negative for cold intolerance and heat intolerance.   Genitourinary:  Negative for dysuria and hematuria.   Musculoskeletal:  Positive for arthralgias and gait problem (frequent falls). Negative for myalgias.   Skin:  Negative for rash.   Neurological:  Negative for dizziness, tremors and seizures.   Hematological:  Negative for adenopathy. Does not bruise/bleed easily.   All other systems reviewed and are negative.  Objective:     Vital Signs (Most Recent):  Temp: 98 °F (36.7 °C) (05/07/23 0814)  Pulse: 65 (05/07/23 0814)  Resp: 16 (05/07/23 0814)  BP:  (!) 147/65 (05/07/23 0814)  SpO2: 96 % (05/07/23 0814) Vital Signs (24h Range):  Temp:  [98 °F (36.7 °C)-98.3 °F (36.8 °C)] 98 °F (36.7 °C)  Pulse:  [62-68] 65  Resp:  [16] 16  SpO2:  [95 %-96 %] 96 %  BP: (123-147)/(57-65) 147/65     Weight: 76.2 kg (167 lb 15.9 oz)  Body mass index is 24.1 kg/m².    Intake/Output Summary (Last 24 hours) at 5/7/2023 1056  Last data filed at 5/7/2023 0243  Gross per 24 hour   Intake --   Output 375 ml   Net -375 ml           Physical Exam  Vitals and nursing note reviewed.   Constitutional:       General: He is awake. He is not in acute distress.     Appearance: Normal appearance. He is well-developed and overweight. He is ill-appearing (elderly).   HENT:      Head: Normocephalic and atraumatic.      Nose: Nose normal. No septal deviation.      Mouth/Throat:      Lips: Pink.      Mouth: Mucous membranes are moist.   Eyes:      Conjunctiva/sclera: Conjunctivae normal.      Pupils: Pupils are equal, round, and reactive to light.   Neck:      Thyroid: No thyroid mass.      Vascular: No JVD.      Trachea: No tracheal tenderness or tracheal deviation.   Cardiovascular:      Rate and Rhythm: Normal rate and regular rhythm.      Heart sounds: S1 normal and S2 normal. No murmur heard.    No friction rub. No gallop.   Pulmonary:      Effort: Pulmonary effort is normal.      Breath sounds: Normal breath sounds. No decreased breath sounds, wheezing, rhonchi or rales.   Abdominal:      General: Bowel sounds are normal. There is no distension.      Palpations: Abdomen is soft. There is no hepatomegaly, splenomegaly or mass.      Tenderness: There is no abdominal tenderness.   Musculoskeletal:      Cervical back: Full passive range of motion without pain, normal range of motion and neck supple.      Left hip: Decreased range of motion.      Left lower leg: Edema present.   Skin:     General: Skin is warm.      Coloration: Skin is pale.      Findings: Wound present. No rash.          Neurological:       General: No focal deficit present.      Mental Status: He is alert and oriented to person, place, and time.      Cranial Nerves: No cranial nerve deficit.      Sensory: No sensory deficit.   Psychiatric:         Mood and Affect: Mood normal.         Behavior: Behavior normal. Behavior is cooperative.           Significant Labs: All pertinent labs within the past 24 hours have been reviewed.    Significant Imaging: I have reviewed all pertinent imaging results/findings within the past 24 hours.      Assessment/Plan:      * Frequent falls  CM working on SNF  Fall precautions  Assist with ADLs  PT/OT consult      Anemia    Patient's anemia is currently uncontrolled. . Etiology likely d/t chronic disease  Current CBC reviewed-   Lab Results   Component Value Date    HGB 8.0 (L) 05/06/2023    HCT 25.2 (L) 05/06/2023     Monitor serial CBC and transfuse if patient becomes hemodynamically unstable, symptomatic or H/H drops below 7/21.   5/5 Transfused one unit PRBCs      Malnutrition of moderate degree  Nutrition consulted. Most recent weight and BMI monitored-     Measurements:  Wt Readings from Last 1 Encounters:   05/04/23 76.2 kg (167 lb 15.9 oz)   Body mass index is 24.1 kg/m².    Patient has been screened and assessed by RD.    Malnutrition Type:  Context: chronic illness  Level: moderate    Malnutrition Characteristic Summary:  Subcutaneous Fat (Malnutrition): mild depletion  Muscle Mass (Malnutrition): mild depletion  Fluid Accumulation (Malnutrition): moderate (3+)    Interventions/Recommendations (treatment strategy):  1) Continue Cardiac diet 2) Send Boost plus 1 x daily 3) weigh weekly    Ulcer of left heel    Noted, chronic  Healing, improved per family  Wound care      Dementia with behavioral disturbance    Noted, chronic  Delirium precautions  Fall precautions  Continue namenda and seroquel    Stage 3 chronic kidney disease    Creatine stable for now. BMP reviewed- noted Estimated Creatinine Clearance:  38.2 mL/min (based on SCr of 1.3 mg/dL). according to latest data. Monitor UOP and serial BMP and adjust therapy as needed. Renally dose meds.          S/P placement of cardiac pacemaker    Noted, on tele    Paroxysmal atrial fibrillation  Admit to tele  NSR on cm  Anticoagulated with Eliquis.        Hyperlipidemia     Patient is chronically on statin.will continue for now. Monitor clinically. Last LDL was No results found for: LDLCALC       Benign prostatic hyperplasia    Noted, chronic  Continue flomax    Benign essential hypertension    Chronic, controlled.  Latest blood pressure and vitals reviewed-   Temp:  [98 °F (36.7 °C)-98.3 °F (36.8 °C)]   Pulse:  [62-68]   Resp:  [16]   BP: (123-147)/(57-65)   SpO2:  [95 %-96 %] .   Home meds for hypertension were reviewed and noted below.   Hypertension Medications             carvediloL (COREG) 6.25 MG tablet Take 6.25 mg by mouth 2 (two) times daily with meals.    furosemide (LASIX) 40 MG tablet Take 40 mg by mouth 2 (two) times daily.          While in the hospital, will manage blood pressure as follows; Continue home antihypertensive regimen    Will utilize p.r.n. blood pressure medication only if patient's blood pressure greater than  180/110 and he develops symptoms such as worsening chest pain or shortness of breath.        VTE Risk Mitigation (From admission, onward)         Ordered     apixaban tablet 5 mg  2 times daily         05/04/23 0519     IP VTE HIGH RISK PATIENT  Once         05/04/23 0519     Place sequential compression device  Until discontinued         05/04/23 0519     Reason for No Pharmacological VTE Prophylaxis  Once        Question:  Reasons:  Answer:  Already adequately anticoagulated on oral Anticoagulants    05/04/23 0519                Discharge Planning   LILIANA: 5/8/2023     Code Status: DNR   Is the patient medically ready for discharge?:     Reason for patient still in hospital (select all that apply): Pending disposition  Discharge Plan  A: Skilled Nursing Facility   Discharge Delays: (!) Patient and Family Barriers              Christal Radford MD  Department of Hospital Medicine   [facility]

## 2023-05-07 NOTE — SUBJECTIVE & OBJECTIVE
Interval History: Patient seen and examined.  Denies any new complaints.  CM working on SNF placement.    Review of Systems   Constitutional:  Negative for chills and fever.   HENT:  Negative for congestion and sore throat.    Eyes:  Negative for visual disturbance.   Respiratory:  Negative for cough and shortness of breath.    Cardiovascular:  Negative for chest pain and palpitations.   Gastrointestinal:  Negative for abdominal pain, constipation, diarrhea, nausea and vomiting.   Endocrine: Negative for cold intolerance and heat intolerance.   Genitourinary:  Negative for dysuria and hematuria.   Musculoskeletal:  Positive for arthralgias and gait problem (frequent falls). Negative for myalgias.   Skin:  Negative for rash.   Neurological:  Negative for dizziness, tremors and seizures.   Hematological:  Negative for adenopathy. Does not bruise/bleed easily.   All other systems reviewed and are negative.  Objective:     Vital Signs (Most Recent):  Temp: 98 °F (36.7 °C) (05/07/23 0814)  Pulse: 65 (05/07/23 0814)  Resp: 16 (05/07/23 0814)  BP: (!) 147/65 (05/07/23 0814)  SpO2: 96 % (05/07/23 0814) Vital Signs (24h Range):  Temp:  [98 °F (36.7 °C)-98.3 °F (36.8 °C)] 98 °F (36.7 °C)  Pulse:  [62-68] 65  Resp:  [16] 16  SpO2:  [95 %-96 %] 96 %  BP: (123-147)/(57-65) 147/65     Weight: 76.2 kg (167 lb 15.9 oz)  Body mass index is 24.1 kg/m².    Intake/Output Summary (Last 24 hours) at 5/7/2023 1056  Last data filed at 5/7/2023 0243  Gross per 24 hour   Intake --   Output 375 ml   Net -375 ml           Physical Exam  Vitals and nursing note reviewed.   Constitutional:       General: He is awake. He is not in acute distress.     Appearance: Normal appearance. He is well-developed and overweight. He is ill-appearing (elderly).   HENT:      Head: Normocephalic and atraumatic.      Nose: Nose normal. No septal deviation.      Mouth/Throat:      Lips: Pink.      Mouth: Mucous membranes are moist.   Eyes:      Conjunctiva/sclera:  Conjunctivae normal.      Pupils: Pupils are equal, round, and reactive to light.   Neck:      Thyroid: No thyroid mass.      Vascular: No JVD.      Trachea: No tracheal tenderness or tracheal deviation.   Cardiovascular:      Rate and Rhythm: Normal rate and regular rhythm.      Heart sounds: S1 normal and S2 normal. No murmur heard.    No friction rub. No gallop.   Pulmonary:      Effort: Pulmonary effort is normal.      Breath sounds: Normal breath sounds. No decreased breath sounds, wheezing, rhonchi or rales.   Abdominal:      General: Bowel sounds are normal. There is no distension.      Palpations: Abdomen is soft. There is no hepatomegaly, splenomegaly or mass.      Tenderness: There is no abdominal tenderness.   Musculoskeletal:      Cervical back: Full passive range of motion without pain, normal range of motion and neck supple.      Left hip: Decreased range of motion.      Left lower leg: Edema present.   Skin:     General: Skin is warm.      Coloration: Skin is pale.      Findings: Wound present. No rash.          Neurological:      General: No focal deficit present.      Mental Status: He is alert and oriented to person, place, and time.      Cranial Nerves: No cranial nerve deficit.      Sensory: No sensory deficit.   Psychiatric:         Mood and Affect: Mood normal.         Behavior: Behavior normal. Behavior is cooperative.           Significant Labs: All pertinent labs within the past 24 hours have been reviewed.    Significant Imaging: I have reviewed all pertinent imaging results/findings within the past 24 hours.

## 2023-05-07 NOTE — PLAN OF CARE
Problem: Adult Inpatient Plan of Care  Goal: Plan of Care Review  Outcome: Ongoing, Progressing  Goal: Patient-Specific Goal (Individualized)  Outcome: Ongoing, Progressing   Patient alert and oriented with times of confusion. resting in bed. NAD. Denies pain or SOB. VSS. Urinal at bedside. Room air. No tele. Bed alarm active. Plan of care reviewed with patient. Verbalizes understanding.Call light in reach. Pt free from fall or injury. Will monitor.

## 2023-05-08 VITALS
TEMPERATURE: 100 F | OXYGEN SATURATION: 98 % | HEART RATE: 62 BPM | DIASTOLIC BLOOD PRESSURE: 61 MMHG | HEIGHT: 70 IN | WEIGHT: 181.88 LBS | BODY MASS INDEX: 26.04 KG/M2 | SYSTOLIC BLOOD PRESSURE: 139 MMHG | RESPIRATION RATE: 16 BRPM

## 2023-05-08 LAB
ALBUMIN SERPL BCP-MCNC: 2.6 G/DL (ref 3.5–5.2)
ALP SERPL-CCNC: 93 U/L (ref 55–135)
ALT SERPL W/O P-5'-P-CCNC: 11 U/L (ref 10–44)
ANION GAP SERPL CALC-SCNC: 7 MMOL/L (ref 8–16)
AST SERPL-CCNC: 21 U/L (ref 10–40)
BASOPHILS # BLD AUTO: 0.06 K/UL (ref 0–0.2)
BASOPHILS NFR BLD: 0.6 % (ref 0–1.9)
BILIRUB SERPL-MCNC: 0.7 MG/DL (ref 0.1–1)
BLD PROD TYP BPU: NORMAL
BLOOD UNIT EXPIRATION DATE: NORMAL
BLOOD UNIT TYPE CODE: 9500
BLOOD UNIT TYPE: NORMAL
BUN SERPL-MCNC: 19 MG/DL (ref 10–30)
CALCIUM SERPL-MCNC: 8.5 MG/DL (ref 8.7–10.5)
CHLORIDE SERPL-SCNC: 109 MMOL/L (ref 95–110)
CO2 SERPL-SCNC: 23 MMOL/L (ref 23–29)
CODING SYSTEM: NORMAL
CREAT SERPL-MCNC: 1.3 MG/DL (ref 0.5–1.4)
CROSSMATCH INTERPRETATION: NORMAL
DIFFERENTIAL METHOD: ABNORMAL
DISPENSE STATUS: NORMAL
EOSINOPHIL # BLD AUTO: 0.5 K/UL (ref 0–0.5)
EOSINOPHIL NFR BLD: 5.7 % (ref 0–8)
ERYTHROCYTE [DISTWIDTH] IN BLOOD BY AUTOMATED COUNT: 15.6 % (ref 11.5–14.5)
EST. GFR  (NO RACE VARIABLE): 52 ML/MIN/1.73 M^2
GLUCOSE SERPL-MCNC: 91 MG/DL (ref 70–110)
HCT VFR BLD AUTO: 23.5 % (ref 40–54)
HGB BLD-MCNC: 7.5 G/DL (ref 14–18)
IMM GRANULOCYTES # BLD AUTO: 0.03 K/UL (ref 0–0.04)
IMM GRANULOCYTES NFR BLD AUTO: 0.3 % (ref 0–0.5)
LYMPHOCYTES # BLD AUTO: 1.7 K/UL (ref 1–4.8)
LYMPHOCYTES NFR BLD: 17.6 % (ref 18–48)
MCH RBC QN AUTO: 30.9 PG (ref 27–31)
MCHC RBC AUTO-ENTMCNC: 31.9 G/DL (ref 32–36)
MCV RBC AUTO: 97 FL (ref 82–98)
MONOCYTES # BLD AUTO: 1.1 K/UL (ref 0.3–1)
MONOCYTES NFR BLD: 12.1 % (ref 4–15)
NEUTROPHILS # BLD AUTO: 6 K/UL (ref 1.8–7.7)
NEUTROPHILS NFR BLD: 63.7 % (ref 38–73)
NRBC BLD-RTO: 0 /100 WBC
NUM UNITS TRANS PACKED RBC: NORMAL
PLATELET # BLD AUTO: 254 K/UL (ref 150–450)
PMV BLD AUTO: 9.4 FL (ref 9.2–12.9)
POTASSIUM SERPL-SCNC: 3.8 MMOL/L (ref 3.5–5.1)
PROT SERPL-MCNC: 5.9 G/DL (ref 6–8.4)
RBC # BLD AUTO: 2.43 M/UL (ref 4.6–6.2)
SODIUM SERPL-SCNC: 139 MMOL/L (ref 136–145)
WBC # BLD AUTO: 9.41 K/UL (ref 3.9–12.7)

## 2023-05-08 PROCEDURE — 97116 GAIT TRAINING THERAPY: CPT | Mod: CQ

## 2023-05-08 PROCEDURE — 41010 INCISION OF TONGUE FOLD: CPT

## 2023-05-08 PROCEDURE — 25000003 PHARM REV CODE 250: Performed by: NURSE PRACTITIONER

## 2023-05-08 PROCEDURE — G0378 HOSPITAL OBSERVATION PER HR: HCPCS

## 2023-05-08 PROCEDURE — 80053 COMPREHEN METABOLIC PANEL: CPT | Performed by: HOSPITALIST

## 2023-05-08 PROCEDURE — 36430 TRANSFUSION BLD/BLD COMPNT: CPT | Mod: 59

## 2023-05-08 PROCEDURE — 85025 COMPLETE CBC W/AUTO DIFF WBC: CPT | Performed by: HOSPITALIST

## 2023-05-08 PROCEDURE — 25000003 PHARM REV CODE 250: Performed by: INTERNAL MEDICINE

## 2023-05-08 PROCEDURE — P9016 RBC LEUKOCYTES REDUCED: HCPCS | Performed by: INTERNAL MEDICINE

## 2023-05-08 PROCEDURE — 36415 COLL VENOUS BLD VENIPUNCTURE: CPT | Performed by: HOSPITALIST

## 2023-05-08 PROCEDURE — 97110 THERAPEUTIC EXERCISES: CPT | Mod: CQ

## 2023-05-08 RX ORDER — CARVEDILOL 3.12 MG/1
3.12 TABLET ORAL 2 TIMES DAILY WITH MEALS
Qty: 60 TABLET | Refills: 11
Start: 2023-05-08 | End: 2024-05-07

## 2023-05-08 RX ORDER — HYDROCODONE BITARTRATE AND ACETAMINOPHEN 500; 5 MG/1; MG/1
TABLET ORAL
Status: DISCONTINUED | OUTPATIENT
Start: 2023-05-08 | End: 2023-05-08 | Stop reason: HOSPADM

## 2023-05-08 RX ORDER — POLYETHYLENE GLYCOL 3350 17 G/17G
17 POWDER, FOR SOLUTION ORAL DAILY
Refills: 0
Start: 2023-05-09

## 2023-05-08 RX ORDER — LANOLIN ALCOHOL/MO/W.PET/CERES
1000 CREAM (GRAM) TOPICAL DAILY
Start: 2023-05-09

## 2023-05-08 RX ADMIN — MEMANTINE 5 MG: 5 TABLET ORAL at 08:05

## 2023-05-08 RX ADMIN — ATORVASTATIN CALCIUM 40 MG: 40 TABLET, FILM COATED ORAL at 08:05

## 2023-05-08 RX ADMIN — CARVEDILOL 3.12 MG: 3.12 TABLET, FILM COATED ORAL at 08:05

## 2023-05-08 RX ADMIN — CYANOCOBALAMIN TAB 1000 MCG 1000 MCG: 1000 TAB at 08:05

## 2023-05-08 RX ADMIN — TAMSULOSIN HYDROCHLORIDE 0.4 MG: 0.4 CAPSULE ORAL at 08:05

## 2023-05-08 RX ADMIN — POLYETHYLENE GLYCOL 3350 17 G: 17 POWDER, FOR SOLUTION ORAL at 08:05

## 2023-05-08 RX ADMIN — ASPIRIN 81 MG: 81 TABLET, COATED ORAL at 08:05

## 2023-05-08 RX ADMIN — CARVEDILOL 3.12 MG: 3.12 TABLET, FILM COATED ORAL at 06:05

## 2023-05-08 RX ADMIN — POTASSIUM BICARBONATE 50 MEQ: 977.5 TABLET, EFFERVESCENT ORAL at 06:05

## 2023-05-08 RX ADMIN — APIXABAN 5 MG: 2.5 TABLET, FILM COATED ORAL at 08:05

## 2023-05-08 NOTE — PLAN OF CARE
Referral sent to [facility] for review       05/08/23 1126   Post-Acute Status   Post-Acute Authorization Home Health   Home Health Status Referrals Sent

## 2023-05-08 NOTE — DISCHARGE SUMMARY
[facility] Hospital Medicine  Discharge Summary      Patient Name: Babak Nunez  MRN: [id number]  IRINA: [id number]  Patient Class: OP- Observation  Admission Date: 5/4/2023  Hospital Length of Stay: 0 days  Discharge Date and Time:  05/08/2023 9:26 AM  Attending Physician: Haritha Estrada MD   Discharging Provider: Haritha Estrada MD  Primary Care Provider: [facility]    Primary Care Team: Networked reference to record PCT     HPI:   Babak Nunez is a 89 year old male with a past medical history of Afib, HTN, HLD, dementia, BPH, DVT and pacemaker placement, who presented to the ED with a complaint of left hip pain after sustaining a fall at home. HPI per family and patient. His family reports that he has been having difficulty with his left hip, which has been coming out of socket more frequently. He was due to have surgery on it today but due to recent events was cancelled. According to his daughters, after a recent admission at [facility], he was sent to [facility] in [place] after hip surgery. They were told that in order to discharge him back home, they would be setting up a hospital bed, walker and wheelchair, as well as other home services to provide for a safe discharge. He began to develop some behavioral issues and was transferred to [facility], where he was diagnosed with early dementia. [facility] discharged the patient back to [facility], but [facility] would not accept for unclear reasons, causing the patient to return home with no DME or home health services. He states he does not want to live in a nursing home, and wants to be at his own house. His wife is also elderly with multiple medical issues, and is unable to care for him on her own. The patient currently denies complaint except for some soreness to the left hip. He denies other complaint    ED work up included a CBC, which showed chronic anemia. CMP showed CKD and chronic moderate malnutrition. Xray of the left hip showed no acute  fracture or dislocation. Due increase in falls with multiple risk factors for complication, decision made to admit to observation for PT/OT evaluation and case management consultation. Hospital Medicine consulted for admission and further management.         * No surgery found *      Hospital Course:    89-year-old  male with past medical history significant for hypertension, hyperlipidemia, dementia, chronic atrial fibrillation, history of DVT status post permanent pacemaker placement who recently underwent left hip surgery followed by Skilled Nursing Facility placement and subsequently went to Behavioral Health unit for dementia with behavioral disturbance.  Admitted with acute kidney injury and possibly moderate-severe anemia with hemoglobin 7.7 which appears to be mixed iron deficiency and B12 deficiency.  He was started on B12 supplementation. No obvious blood loss noted. Patient received a total of 2 PRBC during hospital stay for symptomatic anemia. Patient advised to hold Lasix therapy for now and will keep BP log. He worked with PT and OT who recommended skilled nursing facility. Patient has declined it and  is arranging home health skilled RN and home PT. Rolling walker and Bedside commode requested. Patient will have CBC and CMP arranged. Fall precautions discussed with the patient. Patient feels ready to go home today.      Goals of Care Treatment Preferences:  Code Status: DNR      Consults:   Consults (From admission, onward)          Status Ordering Provider     Inpatient consult to Wound Care Physician  Once        Provider:  Thompson Garcia DO    Completed OMAIRA GIBBS     Inpatient consult to Social Work/Case Management  Once        Provider:  (Not yet assigned)    Completed OMAIRA GIBBS     Case Management/  Once        Provider:  (Not yet assigned)    Completed MAJOR AGUSTIN          Microbiology Results (last 7 days)       ** No results found for the last 168  hours. **          Left hip x-ray: The patient is status post total left hip arthroplasty without evidence new fracture.    Left lower extremity venous doppler scan: No evidence of deep venous thrombosis in the left lower extremity.    Final Active Diagnoses:    Diagnosis Date Noted POA    PRINCIPAL PROBLEM:  Frequent falls [R29.6] 05/04/2023 Not Applicable    Benign essential hypertension [I10] 05/04/2023 Yes    Benign prostatic hyperplasia [N40.0] 05/04/2023 Yes    Hyperlipidemia [E78.5] 05/04/2023 Yes    Paroxysmal atrial fibrillation [I48.0] 05/04/2023 Yes    S/P placement of cardiac pacemaker [Z95.0] 05/04/2023 Yes    Stage 3 chronic kidney disease [N18.30] 05/04/2023 Yes    Dementia with behavioral disturbance [F03.918] 05/04/2023 Yes    Ulcer of left heel [L97.429] 05/04/2023 Yes    Malnutrition of moderate degree [E44.0] 05/04/2023 Yes    Anemia [D64.9] 05/04/2023 Yes      Problems Resolved During this Admission:       Discharged Condition: good    Disposition: Skilled Nursing Facility    Follow Up:   Follow-up Information       [facility] ADMINISTRATION Follow up in 1 week(s).    Contact information:  [address] [address] [facility] [address] [phone]                           Patient Instructions:      Diet Cardiac   Order Comments: Boost Plus can BID     Notify your health care provider if you experience any of the following:  temperature >100.4     Notify your health care provider if you experience any of the following:  persistent nausea and vomiting or diarrhea     Notify your health care provider if you experience any of the following:  severe uncontrolled pain     Notify your health care provider if you experience any of the following:  redness, tenderness, or signs of infection (pain, swelling, redness, odor or green/yellow discharge around incision site)     Notify your health care provider if you experience any of the following:  difficulty breathing or increased cough      Notify your health care provider if you experience any of the following:  severe persistent headache     Notify your health care provider if you experience any of the following:  worsening rash     Notify your health care provider if you experience any of the following:  persistent dizziness, light-headedness, or visual disturbances     Notify your health care provider if you experience any of the following:  increased confusion or weakness     Change dressing (specify)   Order Comments: Dressing change:   Clean left heel with wound cleanser and pat dry. Apply Triad and cover with a cut piece of xeroform and secure with a heel foam dressing. Peel back foam dressing every Mon-Wed-Fri and change xeroform and clean wound and re-apply more Triad and a new piece of xeroform and re-seal foam dressing. Note change foam heel dressing weekly and as needed.    Clean bilateral buttocks with soap and water and dry. Apply a thin layer of Triad every shift and leave open to air.    Clean right heel with wound cleanser and pat dry. Apply a foam heel dressing. Peal back heel dressing daily to assess skin and re-seal foam dressing. Change foam dressing weekly and as needed.     Activity as tolerated   Order Comments: Fall precautions, use walker for ambulation       Significant Diagnostic Studies: Labs:   CMP   Recent Labs   Lab 05/08/23  0419      K 3.8      CO2 23   GLU 91   BUN 19   CREATININE 1.3   CALCIUM 8.5*   PROT 5.9*   ALBUMIN 2.6*   BILITOT 0.7   ALKPHOS 93   AST 21   ALT 11   ANIONGAP 7*   , CBC   Recent Labs   Lab 05/08/23  0419   WBC 9.41   HGB 7.5*   HCT 23.5*       and INR   Lab Results   Component Value Date    INR 1.2 05/04/2023    INR 1.4 (H) 12/12/2015    INR 2.2 (H) 12/12/2015       Pending Diagnostic Studies:       None           Medications:  Reconciled Home Medications:      Medication List        START taking these medications      cyanocobalamin 1000 MCG tablet  Commonly known as:  VITAMIN B-12  Take 1 tablet (1,000 mcg total) by mouth once daily.  Start taking on: May 9, 2023     polyethylene glycol 17 gram Pwpk  Commonly known as: GLYCOLAX  Take 17 g by mouth once daily.  Start taking on: May 9, 2023            CHANGE how you take these medications      carvediloL 3.125 MG tablet  Commonly known as: COREG  Take 1 tablet (3.125 mg total) by mouth 2 (two) times daily with meals.  What changed:   medication strength  how much to take            CONTINUE taking these medications      apixaban 5 mg Tab  Commonly known as: ELIQUIS  Take 5 mg by mouth 2 (two) times daily.     aspirin 81 MG EC tablet  Commonly known as: ECOTRIN  Take 81 mg by mouth once daily.     ATORVASTATIN ORAL  Take 10 mg by mouth once daily.     memantine 10 MG Tab  Commonly known as: NAMENDA  Take 5 mg by mouth 2 (two) times daily.     mirtazapine 15 MG tablet  Commonly known as: REMERON  Take 15 mg by mouth every evening.     nystatin cream  Commonly known as: MYCOSTATIN  Apply topically 2 (two) times daily.     QUEtiapine 50 MG tablet  Commonly known as: SEROQUEL  Take 50 mg by mouth every evening.     tamsulosin 0.4 mg Cap  Commonly known as: FLOMAX  Take by mouth once daily.            STOP taking these medications      furosemide 40 MG tablet  Commonly known as: LASIX              Indwelling Lines/Drains at time of discharge:   Lines/Drains/Airways       None                   Time spent on the discharge of patient: 33 minutes         Haritha Estrada MD  Department of Hospital Medicine  [facility]

## 2023-05-08 NOTE — PLAN OF CARE
Spoke with pt's daughter Lizbeth over the phone. Per Lizbeth, they no longer want the pt placed SNF. They would like to take the pt home to [id number] [place] [address]. They will need DME and HH         05/08/23 1124   Discharge Assessment   Assessment Type Discharge Planning Reassessment   Source of Information family   Discharge Plan A Home Health   Discharge Plan B Home with family   DME Needed Upon Discharge  walker, rolling;bedside commode   Discharge Plan discussed with: Adult children;Patient

## 2023-05-08 NOTE — PLAN OF CARE
Ok to pull RW and BSC per Walker with [facility]. CM to deliver to pt's room prior to DC       05/08/23 1126   Post-Acute Status   Post-Acute Authorization HME   HME Status (!) Pending Delivery   Home Health Status  --

## 2023-05-08 NOTE — PLAN OF CARE
Problem: Adult Inpatient Plan of Care  Goal: Plan of Care Review  Outcome: Ongoing, Progressing  Goal: Readiness for Transition of Care  Outcome: Ongoing, Progressing     Problem: Skin Injury Risk Increased  Goal: Skin Health and Integrity  Outcome: Ongoing, Progressing     Problem: Impaired Wound Healing  Goal: Optimal Wound Healing  Outcome: Ongoing, Progressing   Pt was not able to tolerate standing for orthostatic BP.

## 2023-05-08 NOTE — PLAN OF CARE
Problem: Adult Inpatient Plan of Care  Goal: Plan of Care Review  Outcome: Met  Goal: Patient-Specific Goal (Individualized)  Outcome: Met  Goal: Absence of Hospital-Acquired Illness or Injury  Outcome: Met  Goal: Optimal Comfort and Wellbeing  Outcome: Met  Goal: Readiness for Transition of Care  Outcome: Met     Problem: Fall Injury Risk  Goal: Absence of Fall and Fall-Related Injury  Outcome: Met     Problem: Skin Injury Risk Increased  Goal: Skin Health and Integrity  Outcome: Met     Problem: Impaired Wound Healing  Goal: Optimal Wound Healing  Outcome: Met     Problem: Malnutrition  Goal: Improved Nutritional Intake  Outcome: Met

## 2023-05-08 NOTE — PLAN OF CARE
Problem: Physical Therapy  Goal: Physical Therapy Goal  Description: Goals to be met by: 2023     Patient will increase functional independence with mobility by performin. Supine to sit with MInimal Assistance  2. Sit to stand transfer with Minimal Assistance  3. Bed to chair transfer with Minimal Assistance using Rolling Walker  4. Gait  x 100 feet with Minimal Assistance using Rolling Walker.   5. Lower extremity exercise program x20 reps   Outcome: Ongoing, Progressing   Ambulate with rw and assistance for safety.

## 2023-05-08 NOTE — PLAN OF CARE
Plan to DC to [facility] today       05/08/23 8525   Post-Acute Status   Post-Acute Authorization Placement   Post-Acute Placement Status Pending post-acute provider review/more information requested

## 2023-05-08 NOTE — PLAN OF CARE
Pt was accepted with [facility]. PHN auth [id number]       05/08/23 1150   Post-Acute Status   Post-Acute Authorization [facility]   Home Health Status Set-up Complete/Auth obtained

## 2023-05-08 NOTE — PT/OT/SLP PROGRESS
Physical Therapy Treatment    Patient Name:  Babak Nunez   MRN:  [id number]    Recommendations:     Discharge Recommendations: nursing facility, skilled  Discharge Equipment Recommendations: none  Barriers to discharge: None    Assessment:     Babak Nunez is a 89 y.o. male admitted with a medical diagnosis of Frequent falls.  He presents with the following impairments/functional limitations: weakness, impaired endurance, impaired self care skills, impaired functional mobility, gait instability, decreased lower extremity function, pain, decreased ROM . Supine in bed. Agreeable to therapy as he agrees being out of bed is good for him. He does report that he is fond of sleeping.  Transferred EOB with Min A. Sit > stand with rw and Min A.  Ambulated 20' + 30 ' with rw and Min A with  seated rest between each .   Performed LE exercises seated in chair at bedside.     Rehab Prognosis: Good; patient would benefit from acute skilled PT services to address these deficits and reach maximum level of function.    Recent Surgery: * No surgery found *      Plan:     During this hospitalization, patient to be seen 6 x/week to address the identified rehab impairments via gait training, therapeutic activities, therapeutic exercises and progress toward the following goals:    Plan of Care Expires:  05/30/23    Subjective     Chief Complaint: back pain with activity   Patient/Family Comments/goals: none stated  Pain/Comfort:  Pain Rating 1: other (see comments) (did not rate)  Location - Orientation 1: generalized  Location 1: back  Pain Addressed 1: Reposition, Nurse notified      Objective:     Communicated with nurse Davenport prior to session.  Patient found supine with bed alarm, peripheral IV, telemetry upon PT entry to room.     General Precautions: Standard, fall  Orthopedic Precautions: N/A  Braces: N/A  Respiratory Status: Room air     Functional Mobility:  Bed Mobility:     Rolling Right: minimum assistance  Supine to Sit:  minimum assistance  Transfers:     Sit to Stand:  minimum assistance with rolling walker  Gait: 20' + 30' with rw and Min A with seated rest between each .       AM-PAC 6 CLICK MOBILITY          Treatment & Education:  Transferred to sitting EOB with Min A.  Sit > stand with rw and Min A.  Ambulated 20' + 30' with rw and Min A, slowly with seated rest due to back pain with activity .   BLE exercises: TKE's, Hip abd/ add/ flexion, AP's, SLR, QS, HS.    Patient left up in chair with all lines intact, call button in reach, chair alarm on, and nurse Ethel notified..    GOALS:   Multidisciplinary Problems       Physical Therapy Goals          Problem: Physical Therapy    Goal Priority Disciplines Outcome Goal Variances Interventions   Physical Therapy Goal     PT, PT/OT Ongoing, Progressing     Description: Goals to be met by: 2023     Patient will increase functional independence with mobility by performin. Supine to sit with MInimal Assistance  2. Sit to stand transfer with Minimal Assistance  3. Bed to chair transfer with Minimal Assistance using Rolling Walker  4. Gait  x 100 feet with Minimal Assistance using Rolling Walker.   5. Lower extremity exercise program x20 reps                        Time Tracking:     PT Received On: 23  PT Start Time: 910     PT Stop Time: 933  PT Total Time (min): 23 min     Billable Minutes: Gait Training 13min and Therapeutic Exercise 10min    Treatment Type: Treatment  PT/PTA: PTA     Number of PTA visits since last PT visit: 2023

## 2023-05-08 NOTE — PLAN OF CARE
RW and BSC delivered to pt's room. Pt signed delivery ticket.  Pt was referred to [facility] NP at home and outpatient CM.    ADT30 placed for wheelchair van home. ETA 3:30. Pt's nurse and family aware of above.    Pt clear for discharge from CM standpoint. Discharging home with [facility]. 1st visit 5/9 05/08/23 1304   Final Note   Assessment Type Final Discharge Note   Anticipated Discharge Disposition Home-Health   Hospital Resources/Appts/Education Provided Post-Acute resouces added to AVS   Post-Acute Status   Post-Acute Authorization Home Health   HME Status Set-up Complete/Auth obtained   Home Health Status Set-up Complete/Auth obtained   Discharge Delays (!) PFC Arranged Transportation

## 2023-05-16 ENCOUNTER — PES CALL (OUTPATIENT)
Dept: ADMINISTRATIVE | Facility: CLINIC | Age: 88
End: 2023-05-16
Payer: OTHER GOVERNMENT

## 2023-05-17 ENCOUNTER — PES CALL (OUTPATIENT)
Dept: ADMINISTRATIVE | Facility: CLINIC | Age: 88
End: 2023-05-17
Payer: OTHER GOVERNMENT

## 2023-05-22 ENCOUNTER — PATIENT OUTREACH (OUTPATIENT)
Dept: ADMINISTRATIVE | Facility: OTHER | Age: 88
End: 2023-05-22
Payer: OTHER GOVERNMENT

## 2023-05-22 NOTE — PROGRESS NOTES
CHW - Initial Contact    This Community Health Worker completed the Social Determinant of Health questionnaire with patient & daughter Rajni via telephone today.    Pt identified barriers of most importance are transportation for pt. Pt has had some trouble getting to and from app. Pt's daughter states that [facility]. CHW informed daughter that we cannot outreach any inpatients and was instructed to reach out at discharge.  Referrals to community agencies completed with patient/caregiver consent outside of [facility] include: n/a  Referrals were put through [facility] - no  Follow up required: Y  Follow-up Outreach - Due: 6/5/2023

## 2023-06-14 ENCOUNTER — PATIENT OUTREACH (OUTPATIENT)
Dept: ADMINISTRATIVE | Facility: OTHER | Age: 88
End: 2023-06-14
Payer: OTHER GOVERNMENT

## 2023-06-14 NOTE — PROGRESS NOTES
CHW - Case Closure    This Community Health Worker spoke to caregiver via telephone today.   Pt/Caregiver reported: pt is still hospitalized   Pt/Caregiver denied any additional needs at this time and agrees with episode closure at this time.  Provided caregiver with Community Health Worker's contact information and encouraged him/her to contact this Community Health Worker if additional needs arise.

## 2023-07-13 NOTE — PT/OT/SLP EVAL
Occupational Therapy   Evaluation    Name: Babak Nunez  MRN: [id number]  Admitting Diagnosis: Frequent falls  Recent Surgery: * No surgery found *      Recommendations:     Discharge Recommendations: nursing facility, skilled  Discharge Equipment Recommendations:  none  Barriers to discharge:  None    Assessment:     Babak Nunez is a 89 y.o. male with a medical diagnosis of Frequent falls.  Patient was hesitant initially with participation, but was agreeable with encouragement. Patient c/o BLE weakness and fatigue, limiting further mobility and ADL participation. Patient able to perform grooming tasks seated up in chair with set up assistance.  Performance deficits affecting function: weakness, impaired endurance, impaired self care skills, impaired functional mobility, gait instability, impaired balance, decreased lower extremity function, decreased ROM.      Rehab Prognosis: Good; patient would benefit from acute skilled OT services to address these deficits and reach maximum level of function.       Plan:     Patient to be seen 5 x/week to address the above listed problems via self-care/home management, therapeutic activities, therapeutic exercises  Plan of Care Expires: 05/26/23  Plan of Care Reviewed with: patient    Subjective     Chief Complaint: none  Patient/Family Comments/goals: none    Occupational Profile:  Living Environment: Patient lives with spouse.   Previous level of function: Patient was Mod I with ADLs and ambulatory w/o AD.   Equipment Used at Home: walker, rolling  Assistance upon Discharge: Patient will receive assistance from spouse.     Pain/Comfort:  Pain Rating 1: 0/10  Pain Rating Post-Intervention 1: 0/10    Patients cultural, spiritual, Sabianism conflicts given the current situation:      Objective:     Communicated with: nurse Mckinney prior to session.  Patient found HOB elevated with bed alarm, telemetry, peripheral IV upon OT entry to room.    General Precautions: Standard,  fall  Orthopedic Precautions: N/A  Braces: N/A  Respiratory Status: Room air    Occupational Performance:    Bed Mobility:    Patient completed Scooting/Bridging with stand by assistance  Patient completed Supine to Sit with stand by assistance    Functional Mobility/Transfers:  Patient completed Sit <> Stand Transfer with minimum assistance  with  rolling walker   Patient completed Bed <> Chair Transfer using Stand Pivot technique with minimum assistance with rolling walker  Functional Mobility: Patient ambulated in room from bed>sink>chair requiring CGA using RW. No significant LOB noted.     Activities of Daily Living:  Grooming: supervision with set up to perform hair grooming and oral/facial hygiene while seated in chair  Lower Body Dressing: maximal assistance to don/doff socks while seated EOB  Toileting: supervision with voiding in urinal while seated in chair    Cognitive/Visual Perceptual:  Cognitive/Psychosocial Skills:     -       Oriented to: x4   -       Follows Commands/attention:Follows multistep  commands  -       Communication: clear/fluent  -       Safety awareness/insight to disability: intact   -       Mood/Affect/Coping skills/emotional control: Appropriate to situation and Cooperative  Visual/Perceptual:      -Intact     Physical Exam:  Postural examination/scapula alignment:    -       Rounded shoulders  -       Forward head  Upper Extremity Range of Motion:     -       Right Upper Extremity: WFL  -       Left Upper Extremity: WFL  Upper Extremity Strength:    -       Right Upper Extremity: WFL  -       Left Upper Extremity: WFL   Strength:    -       Right Upper Extremity: WFL  -       Left Upper Extremity: WFL  Fine Motor Coordination:    -       Intact  Gross motor coordination:   WFL    AMPAC 6 Click ADL:  AMPAC Total Score: 17    Treatment & Education:  OT ed pt on OT role & POC as well as discharge recommendations.  OT ed patient on safety with walker use for functional mobility  with cues for hand placement & sequencing.       Patient left up in chair with all lines intact, call button in reach, and chair alarm on    GOALS:   Multidisciplinary Problems       Occupational Therapy Goals          Problem: Occupational Therapy    Goal Priority Disciplines Outcome Interventions   Occupational Therapy Goal     OT, PT/OT Ongoing, Progressing    Description: Goals to be met by: 5/26/2023     Patient will increase functional independence with ADLs by performing:    LE Dressing with Modified [place] and Assistive Devices as needed.  Grooming while standing at sink with Modified [place].  Toileting from toilet with Modified [place] for hygiene and clothing management.   Supine to sit with Modified [place].  Toilet transfer to toilet with Modified [place].                         History:     Past Medical History:   Diagnosis Date    Afib     Anticoagulant long-term use     BPH (benign prostatic hyperplasia)     Hyperlipemia     Hypertension     Renal disorder          Past Surgical History:   Procedure Laterality Date    CARDIAC SURGERY      HIP REPLACEMENT ARTHROPLASTY Left     INSERTION OF PACEMAKER         Time Tracking:     OT Date of Treatment: 05/05/23  OT Start Time: 1015  OT Stop Time: 1035  OT Total Time (min): 20 min    Billable Minutes:Evaluation 10  Self Care/Home Management 10    5/5/2023   You can access the FollowMyHealth Patient Portal offered by [facility] by registering at the following website: [email or web address]. By joining [employer]’s FollowMyHealth portal, you will also be able to view your health information using other applications (apps) compatible with our system.

## 2023-07-21 NOTE — ASSESSMENT & PLAN NOTE
Patient is chronically on statin.will continue for now. Monitor clinically. Last LDL was No results found for: LDLCALC      Oncotype DX bilateral submitted OR [id number].